# Patient Record
Sex: MALE | Race: WHITE | NOT HISPANIC OR LATINO | Employment: OTHER
[De-identification: names, ages, dates, MRNs, and addresses within clinical notes are randomized per-mention and may not be internally consistent; named-entity substitution may affect disease eponyms.]

---

## 2017-06-07 ENCOUNTER — RECORDS - HEALTHEAST (OUTPATIENT)
Dept: ADMINISTRATIVE | Facility: OTHER | Age: 57
End: 2017-06-07

## 2017-08-01 ENCOUNTER — RECORDS - HEALTHEAST (OUTPATIENT)
Dept: LAB | Facility: CLINIC | Age: 57
End: 2017-08-01

## 2017-08-04 LAB
ALBUMIN PERCENT: 66.4 % (ref 51–67)
ALBUMIN SERPL ELPH-MCNC: 4.7 G/DL (ref 3.2–4.7)
ALPHA 1 PERCENT: 2.7 % (ref 2–4)
ALPHA 2 PERCENT: 9.6 % (ref 5–13)
ALPHA1 GLOB SERPL ELPH-MCNC: 0.2 G/DL (ref 0.1–0.3)
ALPHA2 GLOB SERPL ELPH-MCNC: 0.7 G/DL (ref 0.4–0.9)
B-GLOBULIN SERPL ELPH-MCNC: 0.6 G/DL (ref 0.7–1.2)
BETA PERCENT: 8.8 % (ref 10–17)
GAMMA GLOB SERPL ELPH-MCNC: 0.9 G/DL (ref 0.6–1.4)
GAMMA GLOBULIN PERCENT: 12.5 % (ref 9–20)
M PROTEIN SERPL ELPH-MCNC: 0.3 G/DL
PATH ICD:: ABNORMAL
PROT PATTERN SERPL ELPH-IMP: ABNORMAL
PROT SERPL-MCNC: 7.1 G/DL (ref 6–8)
REVIEWING PATHOLOGIST: ABNORMAL

## 2018-01-02 ENCOUNTER — RECORDS - HEALTHEAST (OUTPATIENT)
Dept: ADMINISTRATIVE | Facility: OTHER | Age: 58
End: 2018-01-02

## 2018-06-27 ENCOUNTER — RECORDS - HEALTHEAST (OUTPATIENT)
Dept: ADMINISTRATIVE | Facility: OTHER | Age: 58
End: 2018-06-27

## 2019-04-10 ENCOUNTER — SURGERY - HEALTHEAST (OUTPATIENT)
Dept: GASTROENTEROLOGY | Facility: HOSPITAL | Age: 59
End: 2019-04-10

## 2019-04-19 ENCOUNTER — RECORDS - HEALTHEAST (OUTPATIENT)
Dept: LAB | Facility: CLINIC | Age: 59
End: 2019-04-19

## 2019-04-19 LAB — TSH SERPL DL<=0.005 MIU/L-ACNC: 1.18 UIU/ML (ref 0.3–5)

## 2019-07-25 ENCOUNTER — RECORDS - HEALTHEAST (OUTPATIENT)
Dept: LAB | Facility: CLINIC | Age: 59
End: 2019-07-25

## 2019-07-25 LAB
BASOPHILS # BLD AUTO: 0.1 THOU/UL (ref 0–0.2)
BASOPHILS NFR BLD AUTO: 2 % (ref 0–2)
EOSINOPHIL COUNT (ABSOLUTE): 0.6 THOU/UL (ref 0–0.4)
EOSINOPHIL NFR BLD AUTO: 8 % (ref 0–6)
ERYTHROCYTE [DISTWIDTH] IN BLOOD BY AUTOMATED COUNT: 13.5 % (ref 11–14.5)
HCT VFR BLD AUTO: 44.4 % (ref 40–54)
HGB BLD-MCNC: 14.9 G/DL (ref 14–18)
LYMPHOCYTES # BLD AUTO: 1.2 THOU/UL (ref 0.8–4.4)
LYMPHOCYTES NFR BLD AUTO: 17 % (ref 20–40)
MCH RBC QN AUTO: 36.9 PG (ref 27–34)
MCHC RBC AUTO-ENTMCNC: 33.6 G/DL (ref 32–36)
MCV RBC AUTO: 110 FL (ref 80–100)
MONOCYTES # BLD AUTO: 0.5 THOU/UL (ref 0–0.9)
MONOCYTES NFR BLD AUTO: 7 % (ref 2–10)
PLAT MORPH BLD: NORMAL
PLATELET # BLD AUTO: 317 THOU/UL (ref 140–440)
PMV BLD AUTO: 10.1 FL (ref 8.5–12.5)
RBC # BLD AUTO: 4.04 MILL/UL (ref 4.4–6.2)
TOTAL NEUTROPHILS-ABS(DIFF): 4.8 THOU/UL (ref 2–7.7)
TOTAL NEUTROPHILS-REL(DIFF): 66 % (ref 50–70)
WBC: 7.2 THOU/UL (ref 4–11)

## 2019-08-22 ENCOUNTER — RECORDS - HEALTHEAST (OUTPATIENT)
Dept: LAB | Facility: CLINIC | Age: 59
End: 2019-08-22

## 2019-08-22 LAB
BASOPHILS # BLD AUTO: 0.1 THOU/UL (ref 0–0.2)
BASOPHILS NFR BLD AUTO: 2 % (ref 0–2)
EOSINOPHIL # BLD AUTO: 0.5 THOU/UL (ref 0–0.4)
EOSINOPHIL NFR BLD AUTO: 9 % (ref 0–6)
ERYTHROCYTE [DISTWIDTH] IN BLOOD BY AUTOMATED COUNT: 13.3 % (ref 11–14.5)
HCT VFR BLD AUTO: 40.9 % (ref 40–54)
HGB BLD-MCNC: 13.9 G/DL (ref 14–18)
LYMPHOCYTES # BLD AUTO: 1 THOU/UL (ref 0.8–4.4)
LYMPHOCYTES NFR BLD AUTO: 19 % (ref 20–40)
MCH RBC QN AUTO: 36.4 PG (ref 27–34)
MCHC RBC AUTO-ENTMCNC: 34 G/DL (ref 32–36)
MCV RBC AUTO: 107 FL (ref 80–100)
MONOCYTES # BLD AUTO: 0.5 THOU/UL (ref 0–0.9)
MONOCYTES NFR BLD AUTO: 9 % (ref 2–10)
NEUTROPHILS # BLD AUTO: 3.2 THOU/UL (ref 2–7.7)
NEUTROPHILS NFR BLD AUTO: 60 % (ref 50–70)
PLATELET # BLD AUTO: 246 THOU/UL (ref 140–440)
PMV BLD AUTO: 10.3 FL (ref 8.5–12.5)
RBC # BLD AUTO: 3.82 MILL/UL (ref 4.4–6.2)
WBC: 5.3 THOU/UL (ref 4–11)

## 2019-09-26 ENCOUNTER — RECORDS - HEALTHEAST (OUTPATIENT)
Dept: LAB | Facility: CLINIC | Age: 59
End: 2019-09-26

## 2019-09-26 LAB
BASOPHILS # BLD AUTO: 0.2 THOU/UL (ref 0–0.2)
BASOPHILS NFR BLD AUTO: 3 % (ref 0–2)
EOSINOPHIL COUNT (ABSOLUTE): 0.7 THOU/UL (ref 0–0.4)
EOSINOPHIL NFR BLD AUTO: 9 % (ref 0–6)
ERYTHROCYTE [DISTWIDTH] IN BLOOD BY AUTOMATED COUNT: 12.9 % (ref 11–14.5)
HCT VFR BLD AUTO: 43 % (ref 40–54)
HGB BLD-MCNC: 15.1 G/DL (ref 14–18)
LYMPHOCYTES # BLD AUTO: 0.9 THOU/UL (ref 0.8–4.4)
LYMPHOCYTES NFR BLD AUTO: 12 % (ref 20–40)
MCH RBC QN AUTO: 37.3 PG (ref 27–34)
MCHC RBC AUTO-ENTMCNC: 35.1 G/DL (ref 32–36)
MCV RBC AUTO: 106 FL (ref 80–100)
MONOCYTES # BLD AUTO: 0.7 THOU/UL (ref 0–0.9)
MONOCYTES NFR BLD AUTO: 9 % (ref 2–10)
PLAT MORPH BLD: NORMAL
PLATELET # BLD AUTO: 291 THOU/UL (ref 140–440)
PMV BLD AUTO: 10.1 FL (ref 8.5–12.5)
RBC # BLD AUTO: 4.05 MILL/UL (ref 4.4–6.2)
TOTAL NEUTROPHILS-ABS(DIFF): 5.2 THOU/UL (ref 2–7.7)
TOTAL NEUTROPHILS-REL(DIFF): 67 % (ref 50–70)
WBC: 7.7 THOU/UL (ref 4–11)

## 2019-10-28 ENCOUNTER — RECORDS - HEALTHEAST (OUTPATIENT)
Dept: LAB | Facility: CLINIC | Age: 59
End: 2019-10-28

## 2019-10-28 LAB
BASOPHILS # BLD AUTO: 0.2 THOU/UL (ref 0–0.2)
BASOPHILS NFR BLD AUTO: 2 % (ref 0–2)
EOSINOPHIL # BLD AUTO: 0.7 THOU/UL (ref 0–0.4)
EOSINOPHIL NFR BLD AUTO: 9 % (ref 0–6)
ERYTHROCYTE [DISTWIDTH] IN BLOOD BY AUTOMATED COUNT: 13.1 % (ref 11–14.5)
HCT VFR BLD AUTO: 44 % (ref 40–54)
HGB BLD-MCNC: 15.3 G/DL (ref 14–18)
LYMPHOCYTES # BLD AUTO: 1.4 THOU/UL (ref 0.8–4.4)
LYMPHOCYTES NFR BLD AUTO: 18 % (ref 20–40)
MCH RBC QN AUTO: 36.3 PG (ref 27–34)
MCHC RBC AUTO-ENTMCNC: 34.8 G/DL (ref 32–36)
MCV RBC AUTO: 104 FL (ref 80–100)
MONOCYTES # BLD AUTO: 0.7 THOU/UL (ref 0–0.9)
MONOCYTES NFR BLD AUTO: 9 % (ref 2–10)
NEUTROPHILS # BLD AUTO: 4.5 THOU/UL (ref 2–7.7)
NEUTROPHILS NFR BLD AUTO: 61 % (ref 50–70)
PLATELET # BLD AUTO: 301 THOU/UL (ref 140–440)
PMV BLD AUTO: 10.2 FL (ref 8.5–12.5)
RBC # BLD AUTO: 4.22 MILL/UL (ref 4.4–6.2)
WBC: 7.4 THOU/UL (ref 4–11)

## 2019-12-04 ENCOUNTER — RECORDS - HEALTHEAST (OUTPATIENT)
Dept: LAB | Facility: CLINIC | Age: 59
End: 2019-12-04

## 2019-12-04 LAB
BASOPHILS # BLD AUTO: 0.2 THOU/UL (ref 0–0.2)
BASOPHILS NFR BLD AUTO: 2 % (ref 0–2)
EOSINOPHIL # BLD AUTO: 0.6 THOU/UL (ref 0–0.4)
EOSINOPHIL NFR BLD AUTO: 9 % (ref 0–6)
ERYTHROCYTE [DISTWIDTH] IN BLOOD BY AUTOMATED COUNT: 13.3 % (ref 11–14.5)
HCT VFR BLD AUTO: 44 % (ref 40–54)
HGB BLD-MCNC: 14.8 G/DL (ref 14–18)
LYMPHOCYTES # BLD AUTO: 1.2 THOU/UL (ref 0.8–4.4)
LYMPHOCYTES NFR BLD AUTO: 18 % (ref 20–40)
MCH RBC QN AUTO: 35.9 PG (ref 27–34)
MCHC RBC AUTO-ENTMCNC: 33.6 G/DL (ref 32–36)
MCV RBC AUTO: 107 FL (ref 80–100)
MONOCYTES # BLD AUTO: 0.6 THOU/UL (ref 0–0.9)
MONOCYTES NFR BLD AUTO: 9 % (ref 2–10)
NEUTROPHILS # BLD AUTO: 4.1 THOU/UL (ref 2–7.7)
NEUTROPHILS NFR BLD AUTO: 61 % (ref 50–70)
PLATELET # BLD AUTO: 257 THOU/UL (ref 140–440)
PMV BLD AUTO: 10.1 FL (ref 8.5–12.5)
RBC # BLD AUTO: 4.12 MILL/UL (ref 4.4–6.2)
WBC: 6.8 THOU/UL (ref 4–11)

## 2021-05-30 ENCOUNTER — RECORDS - HEALTHEAST (OUTPATIENT)
Dept: ADMINISTRATIVE | Facility: CLINIC | Age: 61
End: 2021-05-30

## 2021-06-02 ENCOUNTER — RECORDS - HEALTHEAST (OUTPATIENT)
Dept: ADMINISTRATIVE | Facility: CLINIC | Age: 61
End: 2021-06-02

## 2021-06-16 PROBLEM — R14.2 BELCHING: Status: ACTIVE | Noted: 2019-04-10

## 2021-06-16 PROBLEM — Z12.11 COLON CANCER SCREENING: Status: ACTIVE | Noted: 2019-04-10

## 2021-07-08 ENCOUNTER — TELEPHONE (OUTPATIENT)
Dept: HEMATOLOGY | Facility: CLINIC | Age: 61
End: 2021-07-08

## 2021-07-08 NOTE — TELEPHONE ENCOUNTER
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  He is followed for his care primarily at the [facility] but lives in the [facility] and would like to get established with our center so we would be available to get care locally as issues arise. He said that his nurse at [facility], Neema Lawrence RN, recommended he get connected with us.  Mr. Mac would continue to have his annual comprehensive visits at [facility].  Per patient report, he also has factor 11 and factor 12 deficiencies and has the diagnosis of P. Vera.  He is on hydroxyurea.    Reviewed briefly with Dr. Chapito Singer who agreed to see him on Thursday July 22nd at 10 AM.  Message left for Ed with his wife and asked that Ed call back to our scheduling staff to confirm and to update his insurance as needed.      Fallon Clarke RN - Nurse Clinician - [facility] - [phone]

## 2021-07-22 ENCOUNTER — OFFICE VISIT (OUTPATIENT)
Dept: HEMATOLOGY | Facility: CLINIC | Age: 61
End: 2021-07-22
Attending: INTERNAL MEDICINE
Payer: COMMERCIAL

## 2021-07-22 VITALS
TEMPERATURE: 97.9 F | SYSTOLIC BLOOD PRESSURE: 152 MMHG | RESPIRATION RATE: 14 BRPM | WEIGHT: 200 LBS | OXYGEN SATURATION: 97 % | DIASTOLIC BLOOD PRESSURE: 79 MMHG | BODY MASS INDEX: 27.09 KG/M2 | HEART RATE: 82 BPM | HEIGHT: 72 IN

## 2021-07-22 DIAGNOSIS — D66 MILD HEMOPHILIA A (H): Primary | ICD-10-CM

## 2021-07-22 PROCEDURE — 99203 OFFICE O/P NEW LOW 30 MIN: CPT | Performed by: INTERNAL MEDICINE

## 2021-07-22 PROCEDURE — G0463 HOSPITAL OUTPT CLINIC VISIT: HCPCS

## 2021-07-22 RX ORDER — HYDROXYUREA 500 MG/1
500 CAPSULE ORAL EVERY OTHER DAY
COMMUNITY
Start: 2020-10-12

## 2021-07-22 ASSESSMENT — MIFFLIN-ST. JEOR: SCORE: 1745.19

## 2021-07-22 ASSESSMENT — PAIN SCALES - GENERAL: PAINLEVEL: NO PAIN (0)

## 2021-07-24 ENCOUNTER — CARE COORDINATION (OUTPATIENT)
Dept: HEMATOLOGY | Facility: CLINIC | Age: 61
End: 2021-07-24
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
## 2021-10-26 ENCOUNTER — TELEPHONE (OUTPATIENT)
Dept: HEMATOLOGY | Facility: CLINIC | Age: 61
End: 2021-10-26

## 2021-10-26 DIAGNOSIS — D66 MILD HEMOPHILIA A (H): Primary | ICD-10-CM

## 2021-10-26 RX ORDER — AMINOCAPROIC ACID 0.25 G/ML
3 SYRUP ORAL EVERY 8 HOURS
Qty: 236.5 ML | Refills: 1 | Status: SHIPPED | OUTPATIENT
Start: 2021-10-26 | End: 2024-01-04

## 2021-10-26 NOTE — TELEPHONE ENCOUNTER
Francisco Mac  [id number]  1960  Hemophilia A 12%    Francisco Mac called today to let us know that he is requiring a tooth to be pulled in [place]. He is wondering if he needs factor.  He is having significant pain and hoping to get it removed sometime this week, although no date is set.  Discussed with Dr. Singer who thinks he should get a dose of Jivi 40 unit(s)/kg (~3600) in clinic prior to tooth pulling and Amicar syrup 3 gms every 8 hours x 7 days to start 1 hour prior to tooth pulling.    Placed order for Jivi, but PA is required. Pharmacy is working on PA. To alert patient when approved. If out of pocket cost prohibitive, Dr. Singer said he could change factor VIII product.  Kerrie Rasheed, MSN, RN, PHN -Nurse Clinician, [facility] [phone]

## 2021-10-28 ENCOUNTER — ALLIED HEALTH/NURSE VISIT (OUTPATIENT)
Dept: HEMATOLOGY | Facility: CLINIC | Age: 61
End: 2021-10-28
Payer: COMMERCIAL

## 2021-10-28 DIAGNOSIS — D66 MILD HEMOPHILIA A (H): Primary | ICD-10-CM

## 2022-11-08 ENCOUNTER — LAB REQUISITION (OUTPATIENT)
Dept: LAB | Facility: CLINIC | Age: 62
End: 2022-11-08

## 2022-11-08 DIAGNOSIS — R53.82 CHRONIC FATIGUE, UNSPECIFIED: ICD-10-CM

## 2022-11-08 DIAGNOSIS — Z12.5 ENCOUNTER FOR SCREENING FOR MALIGNANT NEOPLASM OF PROSTATE: ICD-10-CM

## 2022-11-08 PROCEDURE — 84443 ASSAY THYROID STIM HORMONE: CPT | Performed by: FAMILY MEDICINE

## 2022-11-08 PROCEDURE — 80053 COMPREHEN METABOLIC PANEL: CPT | Performed by: FAMILY MEDICINE

## 2022-11-08 PROCEDURE — G0103 PSA SCREENING: HCPCS | Performed by: FAMILY MEDICINE

## 2022-11-09 LAB
ALBUMIN SERPL BCG-MCNC: 4.6 G/DL (ref 3.5–5.2)
ALP SERPL-CCNC: 51 U/L (ref 40–129)
ALT SERPL W P-5'-P-CCNC: 29 U/L (ref 10–50)
ANION GAP SERPL CALCULATED.3IONS-SCNC: 12 MMOL/L (ref 7–15)
AST SERPL W P-5'-P-CCNC: 19 U/L (ref 10–50)
BILIRUB SERPL-MCNC: 0.6 MG/DL
BUN SERPL-MCNC: 21.9 MG/DL (ref 8–23)
CALCIUM SERPL-MCNC: 9.6 MG/DL (ref 8.8–10.2)
CHLORIDE SERPL-SCNC: 106 MMOL/L (ref 98–107)
CREAT SERPL-MCNC: 1.06 MG/DL (ref 0.67–1.17)
DEPRECATED HCO3 PLAS-SCNC: 23 MMOL/L (ref 22–29)
GFR SERPL CREATININE-BSD FRML MDRD: 78 ML/MIN/1.73M2
GLUCOSE SERPL-MCNC: 96 MG/DL (ref 70–99)
POTASSIUM SERPL-SCNC: 5.3 MMOL/L (ref 3.4–5.3)
PROT SERPL-MCNC: 6.9 G/DL (ref 6.4–8.3)
PSA SERPL-MCNC: 0.42 NG/ML (ref 0–4.5)
SODIUM SERPL-SCNC: 141 MMOL/L (ref 136–145)
TSH SERPL DL<=0.005 MIU/L-ACNC: 2.34 UIU/ML (ref 0.3–4.2)

## 2022-11-29 ENCOUNTER — LAB REQUISITION (OUTPATIENT)
Dept: LAB | Facility: CLINIC | Age: 62
End: 2022-11-29

## 2022-11-29 DIAGNOSIS — I10 ESSENTIAL (PRIMARY) HYPERTENSION: ICD-10-CM

## 2022-11-29 LAB
ANION GAP SERPL CALCULATED.3IONS-SCNC: 11 MMOL/L (ref 7–15)
BUN SERPL-MCNC: 15.7 MG/DL (ref 8–23)
CALCIUM SERPL-MCNC: 10 MG/DL (ref 8.8–10.2)
CHLORIDE SERPL-SCNC: 104 MMOL/L (ref 98–107)
CREAT SERPL-MCNC: 0.9 MG/DL (ref 0.67–1.17)
DEPRECATED HCO3 PLAS-SCNC: 24 MMOL/L (ref 22–29)
GFR SERPL CREATININE-BSD FRML MDRD: >90 ML/MIN/1.73M2
GLUCOSE SERPL-MCNC: 87 MG/DL (ref 70–99)
POTASSIUM SERPL-SCNC: 4.7 MMOL/L (ref 3.4–5.3)
SODIUM SERPL-SCNC: 139 MMOL/L (ref 136–145)

## 2022-11-29 PROCEDURE — 82310 ASSAY OF CALCIUM: CPT | Performed by: FAMILY MEDICINE

## 2023-01-03 ENCOUNTER — LAB REQUISITION (OUTPATIENT)
Dept: LAB | Facility: CLINIC | Age: 63
End: 2023-01-03

## 2023-01-03 DIAGNOSIS — I10 ESSENTIAL (PRIMARY) HYPERTENSION: ICD-10-CM

## 2023-01-03 LAB
ANION GAP SERPL CALCULATED.3IONS-SCNC: 13 MMOL/L (ref 7–15)
BUN SERPL-MCNC: 15.4 MG/DL (ref 8–23)
CALCIUM SERPL-MCNC: 9.1 MG/DL (ref 8.8–10.2)
CHLORIDE SERPL-SCNC: 108 MMOL/L (ref 98–107)
CREAT SERPL-MCNC: 0.93 MG/DL (ref 0.67–1.17)
DEPRECATED HCO3 PLAS-SCNC: 23 MMOL/L (ref 22–29)
GFR SERPL CREATININE-BSD FRML MDRD: >90 ML/MIN/1.73M2
GLUCOSE SERPL-MCNC: 87 MG/DL (ref 70–99)
POTASSIUM SERPL-SCNC: 5 MMOL/L (ref 3.4–5.3)
SODIUM SERPL-SCNC: 144 MMOL/L (ref 136–145)

## 2023-01-03 PROCEDURE — 80048 BASIC METABOLIC PNL TOTAL CA: CPT | Performed by: FAMILY MEDICINE

## 2023-02-02 ENCOUNTER — LAB REQUISITION (OUTPATIENT)
Dept: LAB | Facility: CLINIC | Age: 63
End: 2023-02-02

## 2023-02-02 DIAGNOSIS — I10 ESSENTIAL (PRIMARY) HYPERTENSION: ICD-10-CM

## 2023-02-02 LAB
ANION GAP SERPL CALCULATED.3IONS-SCNC: 14 MMOL/L (ref 7–15)
BUN SERPL-MCNC: 22.4 MG/DL (ref 8–23)
CALCIUM SERPL-MCNC: 9.6 MG/DL (ref 8.8–10.2)
CHLORIDE SERPL-SCNC: 102 MMOL/L (ref 98–107)
CREAT SERPL-MCNC: 1.02 MG/DL (ref 0.67–1.17)
DEPRECATED HCO3 PLAS-SCNC: 22 MMOL/L (ref 22–29)
GFR SERPL CREATININE-BSD FRML MDRD: 82 ML/MIN/1.73M2
GLUCOSE SERPL-MCNC: 92 MG/DL (ref 70–99)
POTASSIUM SERPL-SCNC: 4.7 MMOL/L (ref 3.4–5.3)
SODIUM SERPL-SCNC: 138 MMOL/L (ref 136–145)

## 2023-02-02 PROCEDURE — 80048 BASIC METABOLIC PNL TOTAL CA: CPT | Performed by: FAMILY MEDICINE

## 2023-02-09 NOTE — TELEPHONE ENCOUNTER
Catheter removed intact. Luis Eburt DAVID Mac called in to check on status of PA for Jivi factor VIII and his tooth is increasingly bothersome.  Per pharmacy, Jivi is approved.  Ed made appt for his dentist for 1410 on 1/28/2021 and agreed to come to clinic for Jivi infusion at 1200.  He will also need to  Amicar at that time. Fallon Clarke, RN - Nurse Clinician - [facility] - [phone]

## 2023-08-08 ENCOUNTER — LAB REQUISITION (OUTPATIENT)
Dept: LAB | Facility: CLINIC | Age: 63
End: 2023-08-08

## 2023-08-08 DIAGNOSIS — I10 ESSENTIAL (PRIMARY) HYPERTENSION: ICD-10-CM

## 2023-08-08 PROCEDURE — 80048 BASIC METABOLIC PNL TOTAL CA: CPT | Performed by: FAMILY MEDICINE

## 2023-08-09 LAB
ANION GAP SERPL CALCULATED.3IONS-SCNC: 14 MMOL/L (ref 7–15)
BUN SERPL-MCNC: 21 MG/DL (ref 8–23)
CALCIUM SERPL-MCNC: 9.2 MG/DL (ref 8.8–10.2)
CHLORIDE SERPL-SCNC: 105 MMOL/L (ref 98–107)
CREAT SERPL-MCNC: 1.41 MG/DL (ref 0.67–1.17)
DEPRECATED HCO3 PLAS-SCNC: 23 MMOL/L (ref 22–29)
GFR SERPL CREATININE-BSD FRML MDRD: 56 ML/MIN/1.73M2
GLUCOSE SERPL-MCNC: 99 MG/DL (ref 70–99)
POTASSIUM SERPL-SCNC: 3.9 MMOL/L (ref 3.4–5.3)
SODIUM SERPL-SCNC: 142 MMOL/L (ref 136–145)

## 2023-09-15 ENCOUNTER — LAB REQUISITION (OUTPATIENT)
Dept: LAB | Facility: CLINIC | Age: 63
End: 2023-09-15
Payer: MEDICARE

## 2023-09-15 DIAGNOSIS — I10 ESSENTIAL (PRIMARY) HYPERTENSION: ICD-10-CM

## 2023-09-15 LAB
ALBUMIN SERPL BCG-MCNC: 4.4 G/DL (ref 3.5–5.2)
ALP SERPL-CCNC: 50 U/L (ref 40–129)
ALT SERPL W P-5'-P-CCNC: 29 U/L (ref 0–70)
ANION GAP SERPL CALCULATED.3IONS-SCNC: 14 MMOL/L (ref 7–15)
AST SERPL W P-5'-P-CCNC: 24 U/L (ref 0–45)
BILIRUB SERPL-MCNC: 1 MG/DL
BUN SERPL-MCNC: 16.8 MG/DL (ref 8–23)
CALCIUM SERPL-MCNC: 9.7 MG/DL (ref 8.8–10.2)
CHLORIDE SERPL-SCNC: 101 MMOL/L (ref 98–107)
CREAT SERPL-MCNC: 1.01 MG/DL (ref 0.67–1.17)
DEPRECATED HCO3 PLAS-SCNC: 24 MMOL/L (ref 22–29)
EGFRCR SERPLBLD CKD-EPI 2021: 83 ML/MIN/1.73M2
GLUCOSE SERPL-MCNC: 85 MG/DL (ref 70–99)
POTASSIUM SERPL-SCNC: 4.1 MMOL/L (ref 3.4–5.3)
PROT SERPL-MCNC: 6.9 G/DL (ref 6.4–8.3)
SODIUM SERPL-SCNC: 139 MMOL/L (ref 136–145)

## 2023-09-15 PROCEDURE — 80053 COMPREHEN METABOLIC PANEL: CPT | Mod: ORL | Performed by: FAMILY MEDICINE

## 2023-11-13 ENCOUNTER — TRANSFERRED RECORDS (OUTPATIENT)
Dept: HEALTH INFORMATION MANAGEMENT | Facility: CLINIC | Age: 63
End: 2023-11-13

## 2023-11-16 ENCOUNTER — TRANSFERRED RECORDS (OUTPATIENT)
Dept: HEALTH INFORMATION MANAGEMENT | Facility: CLINIC | Age: 63
End: 2023-11-16

## 2023-11-16 LAB — EJECTION FRACTION: NORMAL %

## 2023-11-28 ENCOUNTER — TRANSFERRED RECORDS (OUTPATIENT)
Dept: HEALTH INFORMATION MANAGEMENT | Facility: CLINIC | Age: 63
End: 2023-11-28
Payer: COMMERCIAL

## 2023-12-05 ENCOUNTER — TRANSFERRED RECORDS (OUTPATIENT)
Dept: HEALTH INFORMATION MANAGEMENT | Facility: CLINIC | Age: 63
End: 2023-12-05
Payer: COMMERCIAL

## 2023-12-08 ENCOUNTER — MEDICAL CORRESPONDENCE (OUTPATIENT)
Dept: HEALTH INFORMATION MANAGEMENT | Facility: CLINIC | Age: 63
End: 2023-12-08
Payer: COMMERCIAL

## 2023-12-11 DIAGNOSIS — R06.00 DYSPNEA: Primary | ICD-10-CM

## 2023-12-12 RX ORDER — LOSARTAN POTASSIUM AND HYDROCHLOROTHIAZIDE 25; 100 MG/1; MG/1
1 TABLET ORAL DAILY
COMMUNITY

## 2023-12-19 ENCOUNTER — TRANSFERRED RECORDS (OUTPATIENT)
Dept: HEALTH INFORMATION MANAGEMENT | Facility: CLINIC | Age: 63
End: 2023-12-19
Payer: COMMERCIAL

## 2023-12-26 ENCOUNTER — ANESTHESIA EVENT (OUTPATIENT)
Dept: SURGERY | Facility: CLINIC | Age: 63
End: 2023-12-26
Payer: COMMERCIAL

## 2023-12-26 ENCOUNTER — ANESTHESIA (OUTPATIENT)
Dept: SURGERY | Facility: CLINIC | Age: 63
End: 2023-12-26
Payer: COMMERCIAL

## 2023-12-26 ENCOUNTER — HOSPITAL ENCOUNTER (OUTPATIENT)
Facility: CLINIC | Age: 63
Discharge: HOME | End: 2023-12-26
Attending: INTERNAL MEDICINE | Admitting: INTERNAL MEDICINE
Payer: COMMERCIAL

## 2023-12-26 VITALS
DIASTOLIC BLOOD PRESSURE: 67 MMHG | BODY MASS INDEX: 27.22 KG/M2 | RESPIRATION RATE: 16 BRPM | HEART RATE: 72 BPM | WEIGHT: 201 LBS | HEIGHT: 72 IN | TEMPERATURE: 97.3 F | OXYGEN SATURATION: 94 % | SYSTOLIC BLOOD PRESSURE: 117 MMHG

## 2023-12-26 LAB — UPPER GI ENDOSCOPY: NORMAL

## 2023-12-26 PROCEDURE — 272N000001 HC OR GENERAL SUPPLY STERILE: Performed by: INTERNAL MEDICINE

## 2023-12-26 PROCEDURE — 88342 IMHCHEM/IMCYTCHM 1ST ANTB: CPT | Mod: TC | Performed by: INTERNAL MEDICINE

## 2023-12-26 PROCEDURE — 370N000017 HC ANESTHESIA TECHNICAL FEE, PER MIN: Performed by: INTERNAL MEDICINE

## 2023-12-26 PROCEDURE — 250N000011 HC RX IP 250 OP 636: Performed by: NURSE ANESTHETIST, CERTIFIED REGISTERED

## 2023-12-26 PROCEDURE — 258N000003 HC RX IP 258 OP 636: Performed by: ANESTHESIOLOGY

## 2023-12-26 PROCEDURE — 360N000075 HC SURGERY LEVEL 2, PER MIN: Performed by: INTERNAL MEDICINE

## 2023-12-26 PROCEDURE — 999N000141 HC STATISTIC PRE-PROCEDURE NURSING ASSESSMENT: Performed by: INTERNAL MEDICINE

## 2023-12-26 PROCEDURE — 710N000012 HC RECOVERY PHASE 2, PER MINUTE: Performed by: INTERNAL MEDICINE

## 2023-12-26 RX ORDER — NALOXONE HYDROCHLORIDE 0.4 MG/ML
0.2 INJECTION, SOLUTION INTRAMUSCULAR; INTRAVENOUS; SUBCUTANEOUS
Status: DISCONTINUED | OUTPATIENT
Start: 2023-12-26 | End: 2023-12-26 | Stop reason: HOSPADM

## 2023-12-26 RX ORDER — ONDANSETRON 4 MG/1
4 TABLET, ORALLY DISINTEGRATING ORAL EVERY 30 MIN PRN
Status: DISCONTINUED | OUTPATIENT
Start: 2023-12-26 | End: 2023-12-26 | Stop reason: HOSPADM

## 2023-12-26 RX ORDER — OXYCODONE HYDROCHLORIDE 5 MG/1
10 TABLET ORAL
Status: DISCONTINUED | OUTPATIENT
Start: 2023-12-26 | End: 2023-12-26 | Stop reason: HOSPADM

## 2023-12-26 RX ORDER — HEPARIN SODIUM,PORCINE 10 UNIT/ML
5-10 VIAL (ML) INTRAVENOUS
Status: DISCONTINUED | OUTPATIENT
Start: 2023-12-26 | End: 2023-12-26

## 2023-12-26 RX ORDER — PROCHLORPERAZINE MALEATE 5 MG
5 TABLET ORAL EVERY 6 HOURS PRN
Status: DISCONTINUED | OUTPATIENT
Start: 2023-12-26 | End: 2023-12-26 | Stop reason: HOSPADM

## 2023-12-26 RX ORDER — FLUMAZENIL 0.1 MG/ML
0.2 INJECTION, SOLUTION INTRAVENOUS
Status: DISCONTINUED | OUTPATIENT
Start: 2023-12-26 | End: 2023-12-26 | Stop reason: HOSPADM

## 2023-12-26 RX ORDER — NALOXONE HYDROCHLORIDE 0.4 MG/ML
0.4 INJECTION, SOLUTION INTRAMUSCULAR; INTRAVENOUS; SUBCUTANEOUS
Status: DISCONTINUED | OUTPATIENT
Start: 2023-12-26 | End: 2023-12-26 | Stop reason: HOSPADM

## 2023-12-26 RX ORDER — HEPARIN SODIUM,PORCINE 10 UNIT/ML
5-10 VIAL (ML) INTRAVENOUS EVERY 24 HOURS
Status: DISCONTINUED | OUTPATIENT
Start: 2023-12-26 | End: 2023-12-26

## 2023-12-26 RX ORDER — OXYCODONE HYDROCHLORIDE 5 MG/1
5 TABLET ORAL
Status: DISCONTINUED | OUTPATIENT
Start: 2023-12-26 | End: 2023-12-26 | Stop reason: HOSPADM

## 2023-12-26 RX ORDER — LIDOCAINE 40 MG/G
CREAM TOPICAL
Status: DISCONTINUED | OUTPATIENT
Start: 2023-12-26 | End: 2023-12-26 | Stop reason: HOSPADM

## 2023-12-26 RX ORDER — HEPARIN SODIUM (PORCINE) LOCK FLUSH IV SOLN 100 UNIT/ML 100 UNIT/ML
5-10 SOLUTION INTRAVENOUS
Status: DISCONTINUED | OUTPATIENT
Start: 2023-12-26 | End: 2023-12-26

## 2023-12-26 RX ORDER — SODIUM CHLORIDE, SODIUM LACTATE, POTASSIUM CHLORIDE, CALCIUM CHLORIDE 600; 310; 30; 20 MG/100ML; MG/100ML; MG/100ML; MG/100ML
INJECTION, SOLUTION INTRAVENOUS CONTINUOUS
Status: DISCONTINUED | OUTPATIENT
Start: 2023-12-26 | End: 2023-12-26 | Stop reason: HOSPADM

## 2023-12-26 RX ORDER — ONDANSETRON 2 MG/ML
4 INJECTION INTRAMUSCULAR; INTRAVENOUS EVERY 30 MIN PRN
Status: DISCONTINUED | OUTPATIENT
Start: 2023-12-26 | End: 2023-12-26 | Stop reason: HOSPADM

## 2023-12-26 RX ORDER — ONDANSETRON 2 MG/ML
4 INJECTION INTRAMUSCULAR; INTRAVENOUS EVERY 6 HOURS PRN
Status: DISCONTINUED | OUTPATIENT
Start: 2023-12-26 | End: 2023-12-26 | Stop reason: HOSPADM

## 2023-12-26 RX ORDER — PROPOFOL 10 MG/ML
INJECTION, EMULSION INTRAVENOUS CONTINUOUS PRN
Status: DISCONTINUED | OUTPATIENT
Start: 2023-12-26 | End: 2023-12-26

## 2023-12-26 RX ORDER — PROPOFOL 10 MG/ML
INJECTION, EMULSION INTRAVENOUS PRN
Status: DISCONTINUED | OUTPATIENT
Start: 2023-12-26 | End: 2023-12-26

## 2023-12-26 RX ORDER — ONDANSETRON 4 MG/1
4 TABLET, ORALLY DISINTEGRATING ORAL EVERY 6 HOURS PRN
Status: DISCONTINUED | OUTPATIENT
Start: 2023-12-26 | End: 2023-12-26 | Stop reason: HOSPADM

## 2023-12-26 RX ADMIN — PROPOFOL 30 MG: 10 INJECTION, EMULSION INTRAVENOUS at 10:42

## 2023-12-26 RX ADMIN — PROPOFOL 100 MCG/KG/MIN: 10 INJECTION, EMULSION INTRAVENOUS at 10:38

## 2023-12-26 RX ADMIN — PROPOFOL 20 MG: 10 INJECTION, EMULSION INTRAVENOUS at 10:39

## 2023-12-26 RX ADMIN — PROPOFOL 20 MG: 10 INJECTION, EMULSION INTRAVENOUS at 10:46

## 2023-12-26 RX ADMIN — SODIUM CHLORIDE, POTASSIUM CHLORIDE, SODIUM LACTATE AND CALCIUM CHLORIDE: 600; 310; 30; 20 INJECTION, SOLUTION INTRAVENOUS at 09:45

## 2023-12-26 ASSESSMENT — ACTIVITIES OF DAILY LIVING (ADL): ADLS_ACUITY_SCORE: 35
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
## 2023-12-26 NOTE — ANESTHESIA CARE TRANSFER NOTE
Patient: Francisco Mac    Procedure: Procedure(s):  ESOPHAGOGASTRODUODENOSCOPY       Diagnosis: Eructation [R14.2]  Diagnosis Additional Information: No value filed.    Anesthesia Type:   MAC     Note:    Oropharynx: oropharynx clear of all foreign objects and spontaneously breathing  Level of Consciousness: awake  Oxygen Supplementation: face mask  Level of Supplemental Oxygen (L/min / FiO2): 5  Independent Airway: airway patency satisfactory and stable  Dentition: dentition unchanged  Vital Signs Stable: post-procedure vital signs reviewed and stable  Report to RN Given: handoff report given  Patient transferred to: Phase II    Handoff Report: Identifed the Patient, Identified the Reponsible Provider, Reviewed the pertinent medical history, Discussed the surgical course, Reviewed Intra-OP anesthesia mangement and issues during anesthesia, Set expectations for post-procedure period and Allowed opportunity for questions and acknowledgement of understanding  Vitals:  Vitals Value Taken Time   BP     Temp     Pulse     Resp     SpO2         Electronically Signed By: BERNADINE Lao CRNA  December 26, 2023  10:22 AM

## 2023-12-26 NOTE — ANESTHESIA CARE TRANSFER NOTE
Patient: Francisco Mac    Procedure: Procedure(s):  ESOPHAGOGASTRODUODENOSCOPY with biopsies       Diagnosis: Eructation [R14.2]  Diagnosis Additional Information: No value filed.    Anesthesia Type:   MAC     Note:    Oropharynx: oropharynx clear of all foreign objects and spontaneously breathing  Level of Consciousness: awake  Oxygen Supplementation: face mask  Level of Supplemental Oxygen (L/min / FiO2): 5  Independent Airway: airway patency satisfactory and stable  Dentition: dentition unchanged  Vital Signs Stable: post-procedure vital signs reviewed and stable  Report to RN Given: handoff report given  Patient transferred to: Phase II  Comments: VS stable, se Post procedure RN note for VS    Vitals:  Vitals Value Taken Time   BP     Temp     Pulse     Resp     SpO2         Electronically Signed By: BERNADINE Lao CRNA  December 26, 2023  10:59 AM

## 2023-12-26 NOTE — H&P
Pre-procedure Note    Reason for procedure: belching, bloating    History and Physical Reviewed: Reviewed, no changes.    Pre-sedation assessment:    General: alert, appears stated age, and cooperative  Airway: normal  Heart: regular rate and rhythm  Lungs: clear to auscultation bilaterally    Sedation Plan based on assessment: MAC    Mallampati score: Class II (visualization of the soft palate, fauces, and uvula)          ASA Classification: ASA 2 - Patient with mild systemic disease with no functional limitations    Impression: Patient deemed adequate candidate for MAC sedation    Risks, benefits and alternatives were discussed with the patient and informed consent was obtained.    Plan: esophagogastroduodenoscopy    Thank you for the opportunity to participate in the care of this patient. Please feel free to call with any questions or concerns.     Phone number [phone].     Chandra Shukla MD 12/26/2023 10:30 AM

## 2023-12-26 NOTE — ANESTHESIA POSTPROCEDURE EVALUATION
Patient: Francisco Mac    Procedure: Procedure(s):  ESOPHAGOGASTRODUODENOSCOPY with biopsies       Anesthesia Type:  MAC    Note:  Disposition: Outpatient   Postop Pain Control: Uneventful            Sign Out: Well controlled pain   PONV: No   Neuro/Psych: Uneventful            Sign Out: Acceptable/Baseline neuro status   Airway/Respiratory: Uneventful            Sign Out: Acceptable/Baseline resp. status   CV/Hemodynamics: Uneventful            Sign Out: Acceptable CV status; No obvious hypovolemia; No obvious fluid overload   Other NRE: NONE   DID A NON-ROUTINE EVENT OCCUR? No           Last vitals:  Vitals Value Taken Time   /67 12/26/23 1110   Temp 36.3  C (97.3  F) 12/26/23 1100   Pulse 72 12/26/23 1111   Resp     SpO2 95 % 12/26/23 1111   Vitals shown include unfiled device data.    Electronically Signed By: Víctor Carrillo MD  December 26, 2023  12:16 PM

## 2023-12-27 LAB
PATH REPORT.COMMENTS IMP SPEC: NORMAL
PATH REPORT.COMMENTS IMP SPEC: NORMAL
PATH REPORT.FINAL DX SPEC: NORMAL
PATH REPORT.GROSS SPEC: NORMAL
PATH REPORT.MICROSCOPIC SPEC OTHER STN: NORMAL
PATH REPORT.RELEVANT HX SPEC: NORMAL
PHOTO IMAGE: NORMAL

## 2023-12-27 PROCEDURE — 88342 IMHCHEM/IMCYTCHM 1ST ANTB: CPT | Mod: 26 | Performed by: PATHOLOGY

## 2023-12-27 PROCEDURE — 88305 TISSUE EXAM BY PATHOLOGIST: CPT | Mod: 26 | Performed by: PATHOLOGY

## 2024-01-04 ENCOUNTER — OFFICE VISIT (OUTPATIENT)
Dept: PULMONOLOGY | Facility: CLINIC | Age: 64
End: 2024-01-04
Payer: COMMERCIAL

## 2024-01-04 ENCOUNTER — TELEPHONE (OUTPATIENT)
Dept: CARDIOLOGY | Facility: CLINIC | Age: 64
End: 2024-01-04

## 2024-01-04 VITALS
OXYGEN SATURATION: 96 % | WEIGHT: 197.2 LBS | HEART RATE: 93 BPM | HEIGHT: 71 IN | SYSTOLIC BLOOD PRESSURE: 130 MMHG | BODY MASS INDEX: 27.61 KG/M2 | DIASTOLIC BLOOD PRESSURE: 68 MMHG

## 2024-01-04 DIAGNOSIS — R59.0 HILAR LYMPHADENOPATHY: ICD-10-CM

## 2024-01-04 DIAGNOSIS — R91.8 PULMONARY NODULES: ICD-10-CM

## 2024-01-04 DIAGNOSIS — R06.02 SOB (SHORTNESS OF BREATH): ICD-10-CM

## 2024-01-04 DIAGNOSIS — I27.20 PULMONARY HYPERTENSION (H): Primary | ICD-10-CM

## 2024-01-04 DIAGNOSIS — Z11.59 ENCOUNTER FOR SCREENING FOR OTHER VIRAL DISEASES: ICD-10-CM

## 2024-01-04 DIAGNOSIS — R06.09 DYSPNEA ON EXERTION: ICD-10-CM

## 2024-01-04 DIAGNOSIS — R73.03 PREDIABETES: ICD-10-CM

## 2024-01-04 DIAGNOSIS — J44.9 CHRONIC OBSTRUCTIVE PULMONARY DISEASE, UNSPECIFIED COPD TYPE (H): Primary | ICD-10-CM

## 2024-01-04 DIAGNOSIS — R06.00 DYSPNEA: ICD-10-CM

## 2024-01-04 DIAGNOSIS — D50.9 IRON DEFICIENCY ANEMIA, UNSPECIFIED: ICD-10-CM

## 2024-01-04 LAB — HGB BLD-MCNC: 16.4 G/DL

## 2024-01-04 PROCEDURE — 94060 EVALUATION OF WHEEZING: CPT | Performed by: INTERNAL MEDICINE

## 2024-01-04 PROCEDURE — 94726 PLETHYSMOGRAPHY LUNG VOLUMES: CPT | Performed by: INTERNAL MEDICINE

## 2024-01-04 PROCEDURE — 85018 HEMOGLOBIN: CPT | Mod: QW | Performed by: INTERNAL MEDICINE

## 2024-01-04 PROCEDURE — 94729 DIFFUSING CAPACITY: CPT | Performed by: INTERNAL MEDICINE

## 2024-01-04 PROCEDURE — 99204 OFFICE O/P NEW MOD 45 MIN: CPT | Performed by: NURSE PRACTITIONER

## 2024-01-04 RX ORDER — TIOTROPIUM BROMIDE 18 UG/1
18 CAPSULE ORAL; RESPIRATORY (INHALATION) DAILY
Qty: 30 CAPSULE | Refills: 11 | Status: SHIPPED | OUTPATIENT
Start: 2024-01-04 | End: 2024-08-15

## 2024-01-04 RX ORDER — ALBUTEROL SULFATE 90 UG/1
2 AEROSOL, METERED RESPIRATORY (INHALATION) EVERY 6 HOURS PRN
Qty: 18 G | Refills: 3 | Status: SHIPPED | OUTPATIENT
Start: 2024-01-04

## 2024-01-04 NOTE — PROGRESS NOTES
Patient oxygen saturation on RA at rest is 96%.  Oxygen saturation after ambulating 400ft on RA is 93% heart rate 108.        Patient is ambulatory within his/her home.      Isabelle Ash LPN

## 2024-01-04 NOTE — TELEPHONE ENCOUNTER
[facility] Call Center    Phone Message    May a detailed message be left on voicemail: no     Reason for Call: Appointment Intake    Referring Provider Name:     Fior Muñoz APRN CNP in [facility] PULMONOLOGY     Diagnosis and/or Symptoms: Referral for Pulmonary Hypertension; increased dyspnea, decreased DLCO. stress echo with elevated RV pressure 38 mmHg.     Please call pt to schedule.     Action Taken: Other: cardio    Travel Screening: Not Applicable

## 2024-01-04 NOTE — PATIENT INSTRUCTIONS
It was a pleasure seeing you in clinic today. This is what we discussed:    Start the Spiriva 1 puff every day no matter what. Use this daily until we meet again.   You can schedule a CT scan with follow up with Dr. Gil in 3 months.   Use your albuterol every 4 hours as needed for cough, shortness of breath, wheeze, or chest tightness.   Follow up with me in 6 weeks.   If you have worsening symptoms, questions, or need to speak with the nurse please call [phone].

## 2024-01-04 NOTE — PROGRESS NOTES
Pulmonary Outpatient Consult Note  January 4, 2024      Assessment and Plan:   Francisco Mac is a 63 year old male, former smoker, with history of polycythemia vera, HTN, and hemophilia presenting today for evaluation of cough and dyspnea.   Reports dyspnea on exertion for the past few months.  He is still active daily walking his dog and is able to complete all ADLs but has noticed feeling winded.  Around the same time developed a productive cough that is worst at night.  Multiple nighttime wakings to clear sputum.  Also reports unintentional 15 pound weight loss over the past few months with bloating and frequent burping.  Denies reflux.  Previous EGD in 2019 was normal.  He does have a history of polycythemia vera and has planned follow-up with provider at [facility] for this.  PFTs show mild obstruction, no significant bronchodilator response.  Air trapping.  And mildly decreased diffusing capacity.  Clinic walk did not demonstrate desaturation with activity. Lung exam with expiratory rhonchi today.   Recent CT chest shows 7 mm nodule that has increased in size from 2011, left upper lobe 5 mm nodule that has been stable since 2011, and the right lung nodule measuring 6 to 7 mm.  Enlarged right hilar and subcarinal lymph node.  Stress echo on 11/16 shows no evidence of ischemia or infarction.  There is mild tricuspid regurgitation with RV systolic pressure of 38 mmHg and mildly elevated right arterial pressure.    #. Dyspnea with exertion: Given obstructive ratio with emphysema noted on recent CT chest this could be related to COPD.  Unsure if cardiac component given echocardiogram results.   Cardiology referral to discuss possibility of cardiac contribution to shortness of breath.  Will trial inhaler for treatment of COPD.  Encouraged continued activity as able.  #. Cough, COPD: Will trial LAMA inhaler.  If not helpful or if symptoms persist consider adding LABA.  Spiriva or Incruse, whichever is covered by  insurance.  1 puff daily.  We talked about using this every day no matter what until follow-up.  Trial albuterol prior to activity and as needed for cough.  #.  Lung nodules: Given smoking history, enlarged lymph nodes, and weight loss we will plan for recommended CT with contrast with follow-up with MD in clinic.  CT chest with contrast in 3 months.  Ordered today.  #. HCM: Up-to-date with initial COVID series, needs booster.  Recommend seasonal flu, RSV, updated COVID booster, and pneumococcal vaccines if not already done.    RTC 6 weeks for inhaler recheck    Fior Muñoz CNP  Pulmonary Medicine  ______________________________________________________________________________    CC:   Chief Complaint   Patient presents with    Consult     Dyspnea        HPI:   Sid presents today for evaluation of cough and shortness of breath.  Had SOB, cough, and burping that started 3 months ago. Unsure if SOB was progressive, noticed it one day.    [place] and stairs are hard for him but he walks his dog for 1 mile daily.   The cough is worse at night and productive of thick sputum. He will have to get our of bed multiple times per night to cough up mucous. The sputum is white/green.  He has not tried any inhaled medications.  Has been smoking marijuana daily until 2 weeks ago. It has become difficult since the cough has started to do this.  Stop smoking in 2019, he has a 80-pack-year history.  Recent stress echo normal, no evidence of ischemia or infarction.  There was mild tricuspid regurgitation with RV systolic pressure of 38 mmHg and mildly elevated right arterial pressure. Denies edema.   Has lost 15lbs in the past few months.     He has had a EGD that was normal.  Followed by heme-onc at [facility] for polycythemia vera, plans for lab work with follow-up.  On hydroxyurea.  Has throat cancer history noted on chart but patient states this is incorrect. His wife had throat cancer last year and is in remission.     Exposure  history: [employer] work.  Reports regular spirometry testing at work that was normal in the past.    PMH:  Patient Active Problem List    Diagnosis Date Noted    Polycythemia vera (H)      Priority: Medium     Created by Conversion        [place] 04/10/2019     Priority: Medium    Colon cancer screening 04/10/2019     Priority: Medium    Laryngeal Cancer      Priority: Medium     Created by Conversion  Replacement Utility updated for latest IMO load        Monoclonal Gammopathy Of Undetermined Significance      Priority: Medium     Created by Conversion        Iron Deficiency      Priority: Medium     Created by Conversion        Myelodysplasia      Priority: Medium     Created by Conversion        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      Priority: Medium     Created by Conversion           PSH:  Past Surgical History:   Procedure Laterality Date    COLONOSCOPY N/A 4/10/2019    Procedure: COLONOSCOPY;  Surgeon: Sid Waters MD;  Location: [facility];  Service: Gastroenterology    ESOPHAGOSCOPY, GASTROSCOPY, DUODENOSCOPY (EGD), COMBINED N/A 12/26/2023    Procedure: ESOPHAGOGASTRODUODENOSCOPY WITH BIOPSIES;  Surgeon: Chandra Shukla MD;  Location: [facility] Main OR    OTHER SURGICAL HISTORY Left     left shoulder surgeryshoulder cleaned up and bicep cut    OTHER SURGICAL HISTORY  01/01/2009    abdominal vessel surgeryvessel from kidney to spleen ruptured, surgical repaired    IA ESOPHAGOGASTRODUODENOSCOPY TRANSORAL DIAGNOSTIC N/A 4/10/2019    Procedure: ESOPHAGOGASTRODUODENOSCOPY with biopsy;  Surgeon: Sid Waters MD;  Location: [facility];  Service: Gastroenterology       Current Meds:  Current Outpatient Medications   Medication Sig Dispense Refill    hydroxyurea (HYDREA) 500 MG capsule Take 500 mg by mouth every other day      losartan-hydrochlorothiazide (HYZAAR) 100-25 MG tablet Take 1 tablet by mouth daily      omeprazole (PRILOSEC) 20 MG DR capsule Take 20 mg by mouth daily    "   aminocaproic acid (AMICAR) 0.25 GM/ML solution Take 12 mLs (3 g) by mouth every 8 hours 236.5 mL 1         Allergies:  Allergies   Allergen Reactions    Morphine Nausea and Vomiting    Penicillins Rash       Social Hx:  Marital status: Lives with wife  Number of children: 3 children, 6 grandchildren   Resides in a house, built in 1958, no concern for mold.  Occupational history:    service: none  Pets: dog   Smoking history: 80 pack year history  Alcohol use: 3 whiskey drinks per day  Recreational drug use: previous marijuana   Recent Travel: none     Family HX: family history is not on file.    ROS:   ROS: 10-point review performed and notable for the above mentioned symptoms. The remainder reviewed and negative.     Physical Exam:  /68 (BP Location: Left arm, Patient Position: Chair, Cuff Size: Adult Regular)   Pulse 93   Ht 1.81 m (5' 11.25\")   Wt 89.4 kg (197 lb 3.2 oz)   SpO2 96%   BMI 27.31 kg/m      Physical Exam  Constitutional:       General: He is not in acute distress.     Appearance: He is not ill-appearing or diaphoretic.   Cardiovascular:      Rate and Rhythm: Normal rate and regular rhythm.      Heart sounds: Normal heart sounds.   Pulmonary:      Effort: No respiratory distress.      Breath sounds: No stridor. Rhonchi (faint, expiratory in bases) present. No wheezing.   Musculoskeletal:      Right lower leg: No edema.      Left lower leg: No edema.   Neurological:      Mental Status: He is alert.   Psychiatric:         Behavior: Behavior normal.         PFT's     1/4/2024      Impression:  Full Pulmonary Function Test is abnormal.  PFTs are consistent with mild obstructive disease.  Spirometry is not consistent with reversibility. There is improvement in the mid flow rates.   There is no hyperinflation.  There is air-trapping.  Diffusion capacity when corrected for hemoglobin is mildly reduced.    Labs:   personally reviewed in the EMR.    Imaging studies: " personally reviewed and interpreted. Below are the Radiology interpretations.    CT 12/5/2023  No pleural effusions, focal consolidations, or pneumothoraces.  Central airways are patent.  Left upper lobe subpleural nodule abutting the pleura measures 5 mm in size.  Right middle lobe solid pulmonary nodule measures 7 mm.  Right upper lobe apical nodule measures 3 to 4 mm.  Right middle lobe nodularity adjacent to the minor fissure measures 6 to 7 mm, may represent benign intrapulmonary lymph node.  Heart is normal in size without pericardial effusion or pericardial thickening.  Main pulmonary artery and thoracic aorta are normal in caliber.    Mildly enlarged bilateral hilar lymph nodes are present the largest of which is a right hilar lymph node measuring 1.6 cm.  Mildly enlarged subcarinal lymph node measures 1.1 cm.  CONCLUSION:  1. few bilateral solid pulmonary nodules, the largest of which measures up to 7 mm.  Follow-up CT chest in 3 months is recommended given provided history.    2. few mildly enlarged hilar and mediastinal lymph nodes.  Follow-up CT chest with IV contrast in 3 months is recommended.  Alternatively, given patient's reported history of malignancy, PET CT may now be considered, if indicated.

## 2024-01-06 LAB
DLCOCOR-%PRED-PRE: 63 %
DLCOCOR-PRE: 17.61 ML/MIN/MMHG
DLCOUNC-%PRED-PRE: 66 %
DLCOUNC-PRE: 18.44 ML/MIN/MMHG
DLCOUNC-PRED: 27.64 ML/MIN/MMHG
ERV-%PRED-PRE: 63 %
ERV-PRE: 1.01 L
ERV-PRED: 1.59 L
EXPTIME-PRE: 12.09 SEC
FEF2575-%PRED-POST: 47 %
FEF2575-%PRED-PRE: 32 %
FEF2575-POST: 1.25 L/SEC
FEF2575-PRE: 0.86 L/SEC
FEF2575-PRED: 2.63 L/SEC
FEFMAX-%PRED-PRE: 66 %
FEFMAX-PRE: 6.04 L/SEC
FEFMAX-PRED: 9.08 L/SEC
FEV1-%PRED-PRE: 71 %
FEV1-PRE: 2.33 L
FEV1FEV6-PRE: 65 %
FEV1FEV6-PRED: 78 %
FEV1FVC-PRE: 58 %
FEV1FVC-PRED: 77 %
FEV1SVC-PRE: 54 %
FEV1SVC-PRED: 74 %
FIFMAX-PRE: 2.61 L/SEC
FRCPLETH-%PRED-PRE: 119 %
FRCPLETH-PRE: 4.46 L
FRCPLETH-PRED: 3.73 L
FVC-%PRED-PRE: 93 %
FVC-PRE: 3.99 L
FVC-PRED: 4.26 L
IC-%PRED-PRE: 102 %
IC-PRE: 3.24 L
IC-PRED: 3.17 L
RVPLETH-%PRED-PRE: 131 %
RVPLETH-PRE: 3.39 L
RVPLETH-PRED: 2.58 L
TLCPLETH-%PRED-PRE: 104 %
TLCPLETH-PRE: 7.7 L
TLCPLETH-PRED: 7.38 L
VA-%PRED-PRE: 95 %
VA-PRE: 6.46 L
VC-%PRED-PRE: 96 %
VC-PRE: 4.31 L
VC-PRED: 4.47 L

## 2024-01-11 ENCOUNTER — MEDICAL CORRESPONDENCE (OUTPATIENT)
Dept: HEALTH INFORMATION MANAGEMENT | Facility: CLINIC | Age: 64
End: 2024-01-11
Payer: COMMERCIAL

## 2024-01-15 DIAGNOSIS — R06.09 DOE (DYSPNEA ON EXERTION): ICD-10-CM

## 2024-01-15 DIAGNOSIS — Z11.59 ENCOUNTER FOR SCREENING FOR OTHER VIRAL DISEASES: ICD-10-CM

## 2024-01-15 DIAGNOSIS — R06.02 SOB (SHORTNESS OF BREATH): ICD-10-CM

## 2024-01-15 DIAGNOSIS — I27.20 PULMONARY HTN (H): Primary | ICD-10-CM

## 2024-01-15 DIAGNOSIS — R79.9 ABNORMAL FINDING OF BLOOD CHEMISTRY, UNSPECIFIED: ICD-10-CM

## 2024-01-15 DIAGNOSIS — R79.1 ABNORMAL COAGULATION PROFILE: ICD-10-CM

## 2024-01-21 ENCOUNTER — HEALTH MAINTENANCE LETTER (OUTPATIENT)
Age: 64
End: 2024-01-21

## 2024-02-13 ENCOUNTER — OFFICE VISIT (OUTPATIENT)
Dept: CARDIOLOGY | Facility: CLINIC | Age: 64
End: 2024-02-13
Attending: INTERNAL MEDICINE
Payer: COMMERCIAL

## 2024-02-13 ENCOUNTER — PRE VISIT (OUTPATIENT)
Dept: CARDIOLOGY | Facility: CLINIC | Age: 64
End: 2024-02-13

## 2024-02-13 ENCOUNTER — LAB (OUTPATIENT)
Dept: LAB | Facility: CLINIC | Age: 64
End: 2024-02-13
Payer: COMMERCIAL

## 2024-02-13 VITALS
HEART RATE: 98 BPM | HEIGHT: 72 IN | DIASTOLIC BLOOD PRESSURE: 72 MMHG | SYSTOLIC BLOOD PRESSURE: 125 MMHG | BODY MASS INDEX: 27.21 KG/M2 | OXYGEN SATURATION: 96 % | WEIGHT: 200.9 LBS

## 2024-02-13 DIAGNOSIS — R79.1 ABNORMAL COAGULATION PROFILE: ICD-10-CM

## 2024-02-13 DIAGNOSIS — I27.20 PULMONARY HTN (H): ICD-10-CM

## 2024-02-13 DIAGNOSIS — R79.9 ABNORMAL FINDING OF BLOOD CHEMISTRY, UNSPECIFIED: ICD-10-CM

## 2024-02-13 DIAGNOSIS — R06.09 DOE (DYSPNEA ON EXERTION): ICD-10-CM

## 2024-02-13 DIAGNOSIS — R06.09 DYSPNEA ON EXERTION: ICD-10-CM

## 2024-02-13 DIAGNOSIS — Z11.59 ENCOUNTER FOR SCREENING FOR OTHER VIRAL DISEASES: ICD-10-CM

## 2024-02-13 LAB
ALBUMIN SERPL BCG-MCNC: 4.7 G/DL (ref 3.5–5.2)
ALP SERPL-CCNC: 55 U/L (ref 40–150)
ALT SERPL W P-5'-P-CCNC: 50 U/L (ref 0–70)
ANION GAP SERPL CALCULATED.3IONS-SCNC: 13 MMOL/L (ref 7–15)
AST SERPL W P-5'-P-CCNC: 44 U/L (ref 0–45)
BILIRUB DIRECT SERPL-MCNC: 0.26 MG/DL (ref 0–0.3)
BILIRUB SERPL-MCNC: 0.9 MG/DL
BUN SERPL-MCNC: 18.2 MG/DL (ref 8–23)
CALCIUM SERPL-MCNC: 9.8 MG/DL (ref 8.8–10.2)
CHLORIDE SERPL-SCNC: 98 MMOL/L (ref 98–107)
CHOLEST SERPL-MCNC: 170 MG/DL
CREAT SERPL-MCNC: 0.99 MG/DL (ref 0.67–1.17)
CRP SERPL-MCNC: 24.1 MG/L
DEPRECATED HCO3 PLAS-SCNC: 26 MMOL/L (ref 22–29)
DRVVT SCREEN RATIO: 1.01
EGFRCR SERPLBLD CKD-EPI 2021: 85 ML/MIN/1.73M2
ERYTHROCYTE [DISTWIDTH] IN BLOOD BY AUTOMATED COUNT: 13.8 % (ref 10–15)
FASTING STATUS PATIENT QL REPORTED: YES
GLUCOSE SERPL-MCNC: 106 MG/DL (ref 70–99)
HBV CORE AB SERPL QL IA: NONREACTIVE
HBV SURFACE AB SERPL IA-ACNC: <3.5 M[IU]/ML
HBV SURFACE AB SERPL IA-ACNC: NONREACTIVE M[IU]/ML
HBV SURFACE AG SERPL QL IA: NONREACTIVE
HCT VFR BLD AUTO: 49.2 % (ref 40–53)
HCV AB SERPL QL IA: NONREACTIVE
HDLC SERPL-MCNC: 54 MG/DL
HGB BLD-MCNC: 17.2 G/DL (ref 13.3–17.7)
HIV 1+2 AB+HIV1 P24 AG SERPL QL IA: NONREACTIVE
INR PPP: 1.07 (ref 0.85–1.15)
IRON BINDING CAPACITY (ROCHE): 270 UG/DL (ref 240–430)
IRON SATN MFR SERPL: 40 % (ref 15–46)
IRON SERPL-MCNC: 108 UG/DL (ref 61–157)
LA PPP-IMP: POSITIVE
LDLC SERPL CALC-MCNC: 90 MG/DL
LOCATION OF TASK: ABNORMAL
LUPUS INTERPRETATION: ABNORMAL
MCH RBC QN AUTO: 35.4 PG (ref 26.5–33)
MCHC RBC AUTO-ENTMCNC: 35 G/DL (ref 31.5–36.5)
MCV RBC AUTO: 101 FL (ref 78–100)
NONHDLC SERPL-MCNC: 116 MG/DL
NT-PROBNP SERPL-MCNC: <36 PG/ML (ref 0–900)
PLATELET # BLD AUTO: 487 10E3/UL (ref 150–450)
PLATELET NEUTRALIZATION: 1 SECONDS
POTASSIUM SERPL-SCNC: 3.8 MMOL/L (ref 3.4–5.3)
PROT SERPL-MCNC: 8.7 G/DL (ref 6.4–8.3)
PTT RATIO: 1.81
RBC # BLD AUTO: 4.86 10E6/UL (ref 4.4–5.9)
RHEUMATOID FACT SERPL-ACNC: <10 IU/ML
SODIUM SERPL-SCNC: 137 MMOL/L (ref 135–145)
THROMBIN TIME: 15.7 SECONDS (ref 13–19)
TRIGL SERPL-MCNC: 132 MG/DL
TSH SERPL DL<=0.005 MIU/L-ACNC: 1.4 UIU/ML (ref 0.3–4.2)
WBC # BLD AUTO: 14 10E3/UL (ref 4–11)

## 2024-02-13 PROCEDURE — 85613 RUSSELL VIPER VENOM DILUTED: CPT | Performed by: INTERNAL MEDICINE

## 2024-02-13 PROCEDURE — 83550 IRON BINDING TEST: CPT | Performed by: PATHOLOGY

## 2024-02-13 PROCEDURE — 83529 ASAY OF INTERLEUKIN-6 (IL-6): CPT | Performed by: PATHOLOGY

## 2024-02-13 PROCEDURE — 85027 COMPLETE CBC AUTOMATED: CPT | Performed by: PATHOLOGY

## 2024-02-13 PROCEDURE — 86140 C-REACTIVE PROTEIN: CPT | Performed by: PATHOLOGY

## 2024-02-13 PROCEDURE — 82248 BILIRUBIN DIRECT: CPT | Performed by: PATHOLOGY

## 2024-02-13 PROCEDURE — 36415 COLL VENOUS BLD VENIPUNCTURE: CPT | Performed by: PATHOLOGY

## 2024-02-13 PROCEDURE — 84443 ASSAY THYROID STIM HORMONE: CPT | Performed by: PATHOLOGY

## 2024-02-13 PROCEDURE — 83880 ASSAY OF NATRIURETIC PEPTIDE: CPT | Performed by: PATHOLOGY

## 2024-02-13 PROCEDURE — 84238 ASSAY NONENDOCRINE RECEPTOR: CPT | Mod: 90 | Performed by: PATHOLOGY

## 2024-02-13 PROCEDURE — 80053 COMPREHEN METABOLIC PANEL: CPT | Performed by: PATHOLOGY

## 2024-02-13 PROCEDURE — 86803 HEPATITIS C AB TEST: CPT | Performed by: INTERNAL MEDICINE

## 2024-02-13 PROCEDURE — G0463 HOSPITAL OUTPT CLINIC VISIT: HCPCS | Performed by: INTERNAL MEDICINE

## 2024-02-13 PROCEDURE — 86038 ANTINUCLEAR ANTIBODIES: CPT | Performed by: INTERNAL MEDICINE

## 2024-02-13 PROCEDURE — 85610 PROTHROMBIN TIME: CPT | Performed by: PATHOLOGY

## 2024-02-13 PROCEDURE — 85390 FIBRINOLYSINS SCREEN I&R: CPT | Mod: 26 | Performed by: PATHOLOGY

## 2024-02-13 PROCEDURE — 99000 SPECIMEN HANDLING OFFICE-LAB: CPT | Performed by: PATHOLOGY

## 2024-02-13 PROCEDURE — 87389 HIV-1 AG W/HIV-1&-2 AB AG IA: CPT | Performed by: INTERNAL MEDICINE

## 2024-02-13 PROCEDURE — 83540 ASSAY OF IRON: CPT | Performed by: PATHOLOGY

## 2024-02-13 PROCEDURE — 87340 HEPATITIS B SURFACE AG IA: CPT | Performed by: INTERNAL MEDICINE

## 2024-02-13 PROCEDURE — 86706 HEP B SURFACE ANTIBODY: CPT | Performed by: INTERNAL MEDICINE

## 2024-02-13 PROCEDURE — 86704 HEP B CORE ANTIBODY TOTAL: CPT | Performed by: INTERNAL MEDICINE

## 2024-02-13 PROCEDURE — 99205 OFFICE O/P NEW HI 60 MIN: CPT | Performed by: INTERNAL MEDICINE

## 2024-02-13 PROCEDURE — 86431 RHEUMATOID FACTOR QUANT: CPT | Performed by: INTERNAL MEDICINE

## 2024-02-13 PROCEDURE — 80061 LIPID PANEL: CPT | Performed by: PATHOLOGY

## 2024-02-13 ASSESSMENT — PAIN SCALES - GENERAL: PAINLEVEL: NO PAIN (0)

## 2024-02-13 NOTE — PROGRESS NOTES
"2024    Dear Colleagues,    I had the pleasure of seeing your patient Francisco Mac at the [facility] Pulmonary Hypertension clinic.  As you know, Ed is a please 63 year old male with history of COPD, hypertension, and hemophilia who presents for evaluation of dyspnea and potential pulmonary hypertension. Ed had issues with exertional dyspnea and had a work-up and he was found to have COPD. He was started on bronchodilators and he has had a remarkable improvement. He is now able to walk his dog and walk for over 1.5 miles without any problems. He denies any chest pain, pressure, and presyncope. He also has no issues with lower extremity edema. He can go up a flight of stairs without any problems. Overall, I would classify him as WHO FC 1.    He had PFTs that showed obstructive disease with a reduced DLCO. We had a discussion of further work up of testing, and because he is feeling so well he does not want to pursue anything further.       PAST MEDICAL HISTORY:  Past Medical History:   Diagnosis Date    Hemophilia (H)     \"Mild per pt\"    Hypertension        FAMILY HISTORY:  No family history on file.    SOCIAL HISTORY:  Social History     Socioeconomic History    Marital status:      Spouse name: None    Number of children: None    Years of education: None    Highest education level: None   Tobacco Use    Smoking status: Former     Packs/day: 2.00     Years: 40.00     Additional pack years: 0.00     Total pack years: 80.00     Types: Cigarettes     Quit date: 2017     Years since quittin.4     Passive exposure: Past    Smokeless tobacco: Never   Vaping Use    Vaping Use: Never used   Substance and Sexual Activity    Alcohol use: Yes     Alcohol/week: 15.0 standard drinks of alcohol     Types: 15 Standard drinks or equivalent per week     Comment: 2-3 whiskey per night    Drug use: Yes     Types: Marijuana       CURRENT MEDICATIONS:  Current Outpatient Medications " "  Medication Sig Dispense Refill    albuterol (PROAIR HFA/PROVENTIL HFA/VENTOLIN HFA) 108 (90 Base) MCG/ACT inhaler Inhale 2 puffs into the lungs every 6 hours as needed for shortness of breath, wheezing or cough 18 g 3    hydroxyurea (HYDREA) 500 MG capsule Take 500 mg by mouth every other day      losartan-hydrochlorothiazide (HYZAAR) 100-25 MG tablet Take 1 tablet by mouth daily      tiotropium (SPIRIVA) 18 MCG inhaled capsule Inhale 1 capsule (18 mcg) into the lungs daily 30 capsule 11    omeprazole (PRILOSEC) 20 MG DR capsule Take 20 mg by mouth daily (Patient not taking: Reported on 2/13/2024)         ROS:   10 point ROS negative except HPI    EXAM:  /72 (BP Location: Left arm, Patient Position: Sitting, Cuff Size: Adult Regular)   Pulse 98   Ht 1.82 m (5' 11.65\")   Wt 91.1 kg (200 lb 14.4 oz)   SpO2 96%   BMI 27.51 kg/m    General: appears comfortable, alert and articulate  Head: normocephalic, atraumatic  Eyes: anicteric sclera, EOMI  Neck: no adenopathy  Orophyarynx: moist mucosa, no lesions, dentition intact  Heart: regular, S1/S2, no murmur, gallop, rub, estimated JVP 7 cm  Lungs: clear, no rales or wheezing  Abdomen: soft, non-tender,  Extremities: no clubbing, cyanosis or edema  Neurological: normal speech and affect, no gross motor deficits    Labs:  CBC RESULTS:  Lab Results   Component Value Date    WBC 14.0 (H) 02/13/2024    RBC 4.86 02/13/2024    HGB 17.2 02/13/2024    HCT 49.2 02/13/2024     (H) 02/13/2024    MCH 35.4 (H) 02/13/2024    MCHC 35.0 02/13/2024    RDW 13.8 02/13/2024     (H) 02/13/2024       CMP RESULTS:  Lab Results   Component Value Date     09/15/2023    POTASSIUM 4.1 09/15/2023    CHLORIDE 101 09/15/2023    CO2 24 09/15/2023    ANIONGAP 14 09/15/2023    GLC 85 09/15/2023    BUN 16.8 09/15/2023    CR 1.01 09/15/2023    GFRESTIMATED 83 09/15/2023    AGUILA 9.7 09/15/2023    BILITOTAL 1.0 09/15/2023    ALBUMIN 4.4 09/15/2023    ALKPHOS 50 09/15/2023    ALT " 29 09/15/2023    AST 24 09/15/2023        Echocardiogram 11/23:      PFTs 1/24        Assessment and Plan: Francisco Mac is a 63 year old male with COPD who presents for evaluation of COPD.    Dyspnea in setting of COPD: Ed's dyspnea has improved with COPD treatment. He has no concerns currently and he is WHO FC 1 currently. We discussed the possibility of doing a further work up but he declined. I think that is reasonable and instructed him to follow up with us if anything changes. If he had PH, it would be most likely be due to his COPD. At present, we have no treatments for COPD-PH so it would be more of an academic work-up anyways.     It was a pleasure seeing your patient Francisco Mac at the [facility] Pulmonary Hypertension clinic.  Please contact us with any questions or concerns that you may have.    Sincerely,    Roberto Polanco MD, PhD, FAHA  Associate Professor of Medicine  Director, Chronic Thromboembolic Pulmonary Hypertension Program  Cardiovascular Division      I spent a total of 60 minutes on the date of service evaluating this patient which included face to face discussion, performing a physical exam, reviewing of the chart to gain information from other providers to obtain further history, personally reviewing echocardiograms, CT scans showing pulmonary nodulates, pulmonary function tests showing reduced DLCO, ordering tests and/or medications, and documenting clinical information in the electronic health record.

## 2024-02-13 NOTE — LETTER
"2024      RE: Francisco Mac  [address] [place] [address]       Dear Colleague,    Thank you for the opportunity to participate in the care of your patient, Francisco Mac, at the [facility] HEART [facility]IC [place] [facility]. Please see a copy of my visit note below.    2024    Dear Colleagues,    I had the pleasure of seeing your patient Francisco Mac at the [facility] Pulmonary Hypertension clinic.  As you know, Ed is a please 63 year old male with history of COPD, hypertension, and hemophilia who presents for evaluation of dyspnea and potential pulmonary hypertension. Ed had issues with exertional dyspnea and had a work-up and he was found to have COPD. He was started on bronchodilators and he has had a remarkable improvement. He is now able to walk his dog and walk for over 1.5 miles without any problems. He denies any chest pain, pressure, and presyncope. He also has no issues with lower extremity edema. He can go up a flight of stairs without any problems. Overall, I would classify him as WHO FC 1.    He had PFTs that showed obstructive disease with a reduced DLCO. We had a discussion of further work up of testing, and because he is feeling so well he does not want to pursue anything further.       PAST MEDICAL HISTORY:  Past Medical History:   Diagnosis Date     Hemophilia (H)     \"Mild per pt\"     Hypertension        FAMILY HISTORY:  No family history on file.    SOCIAL HISTORY:  Social History     Socioeconomic History     Marital status:      Spouse name: None     Number of children: None     Years of education: None     Highest education level: None   Tobacco Use     Smoking status: Former     Packs/day: 2.00     Years: 40.00     Additional pack years: 0.00     Total pack years: 80.00     Types: Cigarettes     Quit date: 2017     Years since quittin.4     Passive exposure: Past     " "Smokeless tobacco: Never   Vaping Use     Vaping Use: Never used   Substance and Sexual Activity     Alcohol use: Yes     Alcohol/week: 15.0 standard drinks of alcohol     Types: 15 Standard drinks or equivalent per week     Comment: 2-3 whiskey per night     Drug use: Yes     Types: Marijuana       CURRENT MEDICATIONS:  Current Outpatient Medications   Medication Sig Dispense Refill     albuterol (PROAIR HFA/PROVENTIL HFA/VENTOLIN HFA) 108 (90 Base) MCG/ACT inhaler Inhale 2 puffs into the lungs every 6 hours as needed for shortness of breath, wheezing or cough 18 g 3     hydroxyurea (HYDREA) 500 MG capsule Take 500 mg by mouth every other day       losartan-hydrochlorothiazide (HYZAAR) 100-25 MG tablet Take 1 tablet by mouth daily       tiotropium (SPIRIVA) 18 MCG inhaled capsule Inhale 1 capsule (18 mcg) into the lungs daily 30 capsule 11     omeprazole (PRILOSEC) 20 MG DR capsule Take 20 mg by mouth daily (Patient not taking: Reported on 2/13/2024)         ROS:   10 point ROS negative except HPI    EXAM:  /72 (BP Location: Left arm, Patient Position: Sitting, Cuff Size: Adult Regular)   Pulse 98   Ht 1.82 m (5' 11.65\")   Wt 91.1 kg (200 lb 14.4 oz)   SpO2 96%   BMI 27.51 kg/m    General: appears comfortable, alert and articulate  Head: normocephalic, atraumatic  Eyes: anicteric sclera, EOMI  Neck: no adenopathy  Orophyarynx: moist mucosa, no lesions, dentition intact  Heart: regular, S1/S2, no murmur, gallop, rub, estimated JVP 7 cm  Lungs: clear, no rales or wheezing  Abdomen: soft, non-tender,  Extremities: no clubbing, cyanosis or edema  Neurological: normal speech and affect, no gross motor deficits    Labs:  CBC RESULTS:  Lab Results   Component Value Date    WBC 14.0 (H) 02/13/2024    RBC 4.86 02/13/2024    HGB 17.2 02/13/2024    HCT 49.2 02/13/2024     (H) 02/13/2024    MCH 35.4 (H) 02/13/2024    MCHC 35.0 02/13/2024    RDW 13.8 02/13/2024     (H) 02/13/2024       CMP RESULTS:  Lab " Results   Component Value Date     09/15/2023    POTASSIUM 4.1 09/15/2023    CHLORIDE 101 09/15/2023    CO2 24 09/15/2023    ANIONGAP 14 09/15/2023    GLC 85 09/15/2023    BUN 16.8 09/15/2023    CR 1.01 09/15/2023    GFRESTIMATED 83 09/15/2023    AGUILA 9.7 09/15/2023    BILITOTAL 1.0 09/15/2023    ALBUMIN 4.4 09/15/2023    ALKPHOS 50 09/15/2023    ALT 29 09/15/2023    AST 24 09/15/2023        Echocardiogram 11/23:      PFTs 1/24        Assessment and Plan: Francisco Mac is a 63 year old male with COPD who presents for evaluation of COPD.    Dyspnea in setting of COPD: Ed's dyspnea has improved with COPD treatment. He has no concerns currently and he is WHO FC 1 currently. We discussed the possibility of doing a further work up but he declined. I think that is reasonable and instructed him to follow up with us if anything changes. If he had PH, it would be most likely be due to his COPD. At present, we have no treatments for COPD-PH so it would be more of an academic work-up anyways.     It was a pleasure seeing your patient Francisco Mac at the [facility] Pulmonary Hypertension clinic.  Please contact us with any questions or concerns that you may have.    Sincerely,    Roberto Polanco MD, PhD, FAHA  Associate Professor of Medicine  Director, Chronic Thromboembolic Pulmonary Hypertension Program  Cardiovascular Division      I spent a total of 60 minutes on the date of service evaluating this patient which included face to face discussion, performing a physical exam, reviewing of the chart to gain information from other providers to obtain further history, personally reviewing echocardiograms, CT scans showing pulmonary nodulates, pulmonary function tests showing reduced DLCO, ordering tests and/or medications, and documenting clinical information in the electronic health record.                Please do not hesitate to contact me if you have any questions/concerns.     Sincerely,     Roberto Escobedo  MD [place]

## 2024-02-13 NOTE — NURSING NOTE
Chief Complaint   Patient presents with    New Patient     New pulmonary hypertension        Vitals were taken, medications reconciled.    Edelmira Cunha CMA  6:52 AM

## 2024-02-13 NOTE — PATIENT INSTRUCTIONS
You were seen today in the Pulmonary Hypertension Clinic at the [facility].     Cardiology Provider you saw during your visit:    Dr. Polanco    Medication Changes:  - None    Results:       Recommendations:   - Continue to use your inhaler as prescribed    Follow-up:   - Follow up with our clinic as needed    Please call us immediately if you have any syncope (fainting or passing out), chest pain, edema (swelling or weight gain), or decline in your functional status (general decline in how you are feeling).    If you have emergent concerns after hours or on the weekend, please call our on-call Cardiologist at [phone], option [phone]. For emergencies call [phone].     Thank you for allowing us to be a part of your care here at the [facility] Heart Care    If you have questions or concerns please contact us at:    Edelmira Cabral RN (P: [phone])    Nurse Coordinator       Pulmonary Hypertension     [facility] Heart [facility]         SIDNEY Garcia   (Prior Auths & Pt Assistance)   ()  Clinic   Clinic   Pulmonary Hypertension   Pulmonary Hypertension  [facility] Heart [facility]  (P)[phone]    (P) [phone]  (F) [phone]

## 2024-02-13 NOTE — TELEPHONE ENCOUNTER
Patient Name: Francisco Mac Age: 63 year old, male, 1960 MRN: [id number]   Referring Provider:  Fior Muñoz CNP Appt:  02/13/24        PH Medications:  none      Patient History: Pt has a history of  former smoker, with history of polycythemia vera, COPD, HTN, and hemophilia     **increased dyspnea, decreased DLCO. stress echo with elevated RV pressure 38 mmHg.       Recent Testing:       Date Test Epic Media/Scan Care Everywhere    12/28/23 Stress Echo   [x]  []   []       []  []   []     11/13/23 EKG        SR 93 [x]   []   []      6MWT       Meters/Lowest Sa02                      Max HR []   []   []     1/4/24 PFT              FEV1/FVC  is reduced at 58     FEV1 is reduced at 71% predicted     FVC is normal     There was no improvement in spirometry after a single inhaled dose of bronchodilator     TLC is normal     DLCO is reduced at 63% predicted when it is corrected for hemoglobin.  [x]   []   []      Sleep Study   []  []   []     12/5/23 Chest CT     [x]  []   []      V/Q Scan []   []   []      Abd/Liver US []   []   []      Misc:  []   []   []         []   []   []

## 2024-02-14 LAB
ANA SER QL IF: NEGATIVE
STFR SERPL-MCNC: 2.6 MG/L

## 2024-02-15 ENCOUNTER — OFFICE VISIT (OUTPATIENT)
Dept: PULMONOLOGY | Facility: CLINIC | Age: 64
End: 2024-02-15
Attending: NURSE PRACTITIONER
Payer: COMMERCIAL

## 2024-02-15 VITALS
HEART RATE: 109 BPM | DIASTOLIC BLOOD PRESSURE: 60 MMHG | WEIGHT: 205 LBS | BODY MASS INDEX: 28.07 KG/M2 | SYSTOLIC BLOOD PRESSURE: 120 MMHG | OXYGEN SATURATION: 96 %

## 2024-02-15 DIAGNOSIS — J44.9 CHRONIC OBSTRUCTIVE PULMONARY DISEASE, UNSPECIFIED COPD TYPE (H): Primary | ICD-10-CM

## 2024-02-15 DIAGNOSIS — R06.09 DYSPNEA ON EXERTION: ICD-10-CM

## 2024-02-15 DIAGNOSIS — R91.8 PULMONARY NODULES: ICD-10-CM

## 2024-02-15 LAB — IL6 SERPL-MCNC: 6.64 PG/ML

## 2024-02-15 PROCEDURE — 99214 OFFICE O/P EST MOD 30 MIN: CPT | Performed by: NURSE PRACTITIONER

## 2024-02-15 NOTE — PATIENT INSTRUCTIONS
It was a pleasure seeing you in clinic today. This is what we discussed:    Check to see if Spiriva Respimat (different form of Spiriva) or Incruse is any cheaper than the inhaler you have.   Use your albuterol every 4 hours as needed for cough, shortness of breath, wheeze, or chest tightness.   Follow up in 6 months   If you have worsening symptoms, questions, or need to speak with the nurse please call [phone].

## 2024-02-15 NOTE — PROGRESS NOTES
Pulmonary Outpatient Consult Note  February 15, 2024      Assessment and Plan:   Francisco Mac is a 63 year old male, former smoker, with history of polycythemia vera, HTN, and hemophilia presenting today for evaluation of cough and dyspnea.   Reported dyspnea on exertion and productive cough that significantly improved with Spiriva. No longer wakes at night to clear sputum. Also reports unintentional 15 pound weight loss over the past few months with bloating and frequent burping.  Denies reflux. Previous EGD in 2019 was normal.  History of polycythemia vera and has planned follow-up with provider at [facility] for this.  PFTs show mild obstruction, no significant bronchodilator response.  Air trapping.  And mildly decreased diffusing capacity.    Clinic walk did not demonstrate desaturation with activity. Lung exam normal today.   Recent CT chest shows 7 mm nodule that has increased in size from 2011, left upper lobe 5 mm nodule that has been stable since 2011, and the right lung nodule measuring 6 to 7 mm.  Enlarged right hilar and subcarinal lymph node.  Stress echo on 11/16 shows no evidence of ischemia or infarction.  There is mild tricuspid regurgitation with RV systolic pressure of 38 mmHg and mildly elevated right arterial pressure.    #. Dyspnea with exertion: Given obstructive ratio with emphysema noted on recent CT chest this could be related to COPD.  Unsure if cardiac component given echocardiogram results.   Cardiology referral to discuss possibility of cardiac contribution to shortness of breath.  Will trial inhaler for treatment of COPD.  Encouraged continued activity as able.  #. Cough, COPD: Will trial LAMA inhaler.  If not helpful or if symptoms persist consider adding LABA.  Spiriva or Incruse, whichever is covered by insurance.  1 puff daily.  We talked about using this every day no matter what until follow-up.  Trial albuterol prior to activity and as needed for cough.  #.  Lung nodules:  Given smoking history, enlarged lymph nodes, and weight loss we will plan for recommended CT with contrast with follow-up with MD in clinic.  CT chest with contrast in 3 months.  Ordered today.  #. HCM: Up-to-date with initial COVID series, needs booster.  Recommend seasonal flu, RSV, updated COVID booster, and pneumococcal vaccines if not already done.    If his symptoms exacerbate: prednisone 40mg daily x 5 days. If sputum amount or thickness increased add azithromycin zpak x 5 days.     RTC 6 months. Has planned follow up with MD after next CT scan in April.     Fior Muñoz, CNP  Pulmonary Medicine  ______________________________________________________________________________    CC:   Chief Complaint   Patient presents with    Follow Up     COPD and inhaler check        HPI:   Sid was last seen in clinic on 1/3/2024. At that time we started Spiriva.   He reports good relief and is happy with his breathing. Has not been needing albuterol.   Less sputum and cough. No nighttime wakings.   Denies wheeze or chest tightness.     Had SOB, cough, and burping that started 3 months ago. Unsure if SOB was progressive, noticed it one day.    [place] and stairs are hard for him but he walks his dog for 1 mile daily. Improved since on Spiriva.   Has been smoking marijuana occasionally. Notices his breathing is worse after smoking. Stop smoking in 2019, he has a 80-pack-year history.  Recent stress echo normal, no evidence of ischemia or infarction.  There was mild tricuspid regurgitation with RV systolic pressure of 38 mmHg and mildly elevated right arterial pressure. Denies edema.   Has lost 15lbs in the past few months.     He has had a EGD that was normal.  Followed by heme-onc at [facility] for polycythemia vera, plans for lab work with follow-up.  On hydroxyurea.  Has throat cancer history noted on chart but patient states this is incorrect. His wife had throat cancer last year and is in remission.     Exposure history:  [employer] work.  Reports regular spirometry testing at work that was normal in the past.    PMH:  Patient Active Problem List    Diagnosis Date Noted    Polycythemia vera (H)      Priority: Medium     Created by Conversion        [place] 04/10/2019     Priority: Medium    Colon cancer screening 04/10/2019     Priority: Medium    Laryngeal Cancer      Priority: Medium     Created by Conversion  Replacement Utility updated for latest IMO load        Monoclonal Gammopathy Of Undetermined Significance      Priority: Medium     Created by Conversion        Iron Deficiency      Priority: Medium     Created by Conversion        Myelodysplasia      Priority: Medium     Created by Conversion        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      Priority: Medium     Created by Conversion         PSH:  Past Surgical History:   Procedure Laterality Date    COLONOSCOPY N/A 4/10/2019    Procedure: COLONOSCOPY;  Surgeon: Sid Waters MD;  Location: [facility];  Service: Gastroenterology    ESOPHAGOSCOPY, GASTROSCOPY, DUODENOSCOPY (EGD), COMBINED N/A 12/26/2023    Procedure: ESOPHAGOGASTRODUODENOSCOPY WITH BIOPSIES;  Surgeon: Chandra Shukla MD;  Location: [facility] Main OR    OTHER SURGICAL HISTORY Left     left shoulder surgeryshoulder cleaned up and bicep cut    OTHER SURGICAL HISTORY  01/01/2009    abdominal vessel surgeryvessel from kidney to spleen ruptured, surgical repaired    UT ESOPHAGOGASTRODUODENOSCOPY TRANSORAL DIAGNOSTIC N/A 4/10/2019    Procedure: ESOPHAGOGASTRODUODENOSCOPY with biopsy;  Surgeon: Sid Waters MD;  Location: [facility];  Service: Gastroenterology     Current Meds:  Current Outpatient Medications   Medication Sig Dispense Refill    albuterol (PROAIR HFA/PROVENTIL HFA/VENTOLIN HFA) 108 (90 Base) MCG/ACT inhaler Inhale 2 puffs into the lungs every 6 hours as needed for shortness of breath, wheezing or cough 18 g 3    hydroxyurea (HYDREA) 500 MG capsule Take 500 mg by mouth  every other day      losartan-hydrochlorothiazide (HYZAAR) 100-25 MG tablet Take 1 tablet by mouth daily      tiotropium (SPIRIVA) 18 MCG inhaled capsule Inhale 1 capsule (18 mcg) into the lungs daily 30 capsule 11    omeprazole (PRILOSEC) 20 MG DR capsule Take 20 mg by mouth daily (Patient not taking: Reported on 2/13/2024)       Allergies:  Allergies   Allergen Reactions    Morphine Nausea and Vomiting    Penicillins Rash     Social Hx:  Marital status: Lives with wife  Number of children: 3 children, 6 grandchildren   Resides in a house, built in [place], no concern for mold.  Occupational history:    service: none  Pets: dog   Smoking history: 80 pack year history  Alcohol use: 3 whiskey drinks per day  Recreational drug use: previous marijuana   Recent Travel: none     Family HX: family history is not on file.    ROS:   ROS: 10-point review performed and notable for the above mentioned symptoms. The remainder reviewed and negative.     Physical Exam:  /60 (BP Location: Left arm, Patient Position: Chair, Cuff Size: Adult Regular)   Pulse 109   Wt 93 kg (205 lb)   SpO2 96%   BMI 28.07 kg/m      Physical Exam  Constitutional:       General: He is not in acute distress.     Appearance: He is not ill-appearing or diaphoretic.   Cardiovascular:      Rate and Rhythm: Normal rate and regular rhythm.      Heart sounds: Normal heart sounds.   Pulmonary:      Effort: Pulmonary effort is normal. No respiratory distress.      Breath sounds: No stridor. No wheezing or rhonchi.   Musculoskeletal:      Right lower leg: No edema.      Left lower leg: No edema.   Neurological:      Mental Status: He is alert.   Psychiatric:         Behavior: Behavior normal.         PFT's     1/4/2024      Impression:  Full Pulmonary Function Test is abnormal.  PFTs are consistent with mild obstructive disease.  Spirometry is not consistent with reversibility. There is improvement in the mid flow rates.   There is no  hyperinflation.  There is air-trapping.  Diffusion capacity when corrected for hemoglobin is mildly reduced.    Labs:   personally reviewed in the EMR.    Imaging studies: personally reviewed and interpreted. Below are the Radiology interpretations.    CT 12/5/2023  No pleural effusions, focal consolidations, or pneumothoraces.  Central airways are patent.  Left upper lobe subpleural nodule abutting the pleura measures 5 mm in size.  Right middle lobe solid pulmonary nodule measures 7 mm.  Right upper lobe apical nodule measures 3 to 4 mm.  Right middle lobe nodularity adjacent to the minor fissure measures 6 to 7 mm, may represent benign intrapulmonary lymph node.  Heart is normal in size without pericardial effusion or pericardial thickening.  Main pulmonary artery and thoracic aorta are normal in caliber.    Mildly enlarged bilateral hilar lymph nodes are present the largest of which is a right hilar lymph node measuring 1.6 cm.  Mildly enlarged subcarinal lymph node measures 1.1 cm.  CONCLUSION:  1. few bilateral solid pulmonary nodules, the largest of which measures up to 7 mm.  Follow-up CT chest in 3 months is recommended given provided history.    2. few mildly enlarged hilar and mediastinal lymph nodes.  Follow-up CT chest with IV contrast in 3 months is recommended.  Alternatively, given patient's reported history of malignancy, PET CT may now be considered, if indicated.

## 2024-03-01 ENCOUNTER — LAB REQUISITION (OUTPATIENT)
Dept: LAB | Facility: CLINIC | Age: 64
End: 2024-03-01
Payer: COMMERCIAL

## 2024-03-01 DIAGNOSIS — Z12.5 ENCOUNTER FOR SCREENING FOR MALIGNANT NEOPLASM OF PROSTATE: ICD-10-CM

## 2024-03-01 DIAGNOSIS — I10 ESSENTIAL (PRIMARY) HYPERTENSION: ICD-10-CM

## 2024-03-01 PROCEDURE — 80053 COMPREHEN METABOLIC PANEL: CPT | Mod: ORL | Performed by: FAMILY MEDICINE

## 2024-03-01 PROCEDURE — G0103 PSA SCREENING: HCPCS | Mod: ORL | Performed by: FAMILY MEDICINE

## 2024-03-02 LAB
ALBUMIN SERPL BCG-MCNC: 4.5 G/DL (ref 3.5–5.2)
ALP SERPL-CCNC: 54 U/L (ref 40–150)
ALT SERPL W P-5'-P-CCNC: 59 U/L (ref 0–70)
ANION GAP SERPL CALCULATED.3IONS-SCNC: 14 MMOL/L (ref 7–15)
AST SERPL W P-5'-P-CCNC: 45 U/L (ref 0–45)
BILIRUB SERPL-MCNC: 0.8 MG/DL
BUN SERPL-MCNC: 13.4 MG/DL (ref 8–23)
CALCIUM SERPL-MCNC: 9.4 MG/DL (ref 8.8–10.2)
CHLORIDE SERPL-SCNC: 101 MMOL/L (ref 98–107)
CREAT SERPL-MCNC: 0.95 MG/DL (ref 0.67–1.17)
DEPRECATED HCO3 PLAS-SCNC: 22 MMOL/L (ref 22–29)
EGFRCR SERPLBLD CKD-EPI 2021: 89 ML/MIN/1.73M2
GLUCOSE SERPL-MCNC: 96 MG/DL (ref 70–99)
POTASSIUM SERPL-SCNC: 4.2 MMOL/L (ref 3.4–5.3)
PROT SERPL-MCNC: 7.8 G/DL (ref 6.4–8.3)
PSA SERPL DL<=0.01 NG/ML-MCNC: 0.59 NG/ML (ref 0–4.5)
SODIUM SERPL-SCNC: 137 MMOL/L (ref 135–145)

## 2024-04-08 ENCOUNTER — HOSPITAL ENCOUNTER (OUTPATIENT)
Dept: CT IMAGING | Facility: HOSPITAL | Age: 64
Discharge: HOME | End: 2024-04-08
Attending: NURSE PRACTITIONER | Admitting: NURSE PRACTITIONER
Payer: COMMERCIAL

## 2024-04-08 DIAGNOSIS — R91.8 PULMONARY NODULES: ICD-10-CM

## 2024-04-08 DIAGNOSIS — R59.0 HILAR LYMPHADENOPATHY: ICD-10-CM

## 2024-04-08 PROCEDURE — 71260 CT THORAX DX C+: CPT

## 2024-04-08 PROCEDURE — 250N000011 HC RX IP 250 OP 636: Performed by: NURSE PRACTITIONER

## 2024-04-08 PROCEDURE — 250N000009 HC RX 250: Performed by: NURSE PRACTITIONER

## 2024-04-08 RX ORDER — IOPAMIDOL 755 MG/ML
90 INJECTION, SOLUTION INTRAVASCULAR ONCE
Status: COMPLETED | OUTPATIENT
Start: 2024-04-08 | End: 2024-04-08

## 2024-04-08 RX ADMIN — SODIUM CHLORIDE 90 ML: 9 INJECTION, SOLUTION INTRAVENOUS at 08:02

## 2024-04-08 RX ADMIN — IOPAMIDOL 90 ML: 755 INJECTION, SOLUTION INTRAVENOUS at 07:57

## 2024-04-25 ENCOUNTER — OFFICE VISIT (OUTPATIENT)
Dept: PULMONOLOGY | Facility: CLINIC | Age: 64
End: 2024-04-25
Attending: NURSE PRACTITIONER
Payer: COMMERCIAL

## 2024-04-25 VITALS
DIASTOLIC BLOOD PRESSURE: 64 MMHG | OXYGEN SATURATION: 95 % | BODY MASS INDEX: 27.94 KG/M2 | WEIGHT: 204 LBS | HEART RATE: 84 BPM | SYSTOLIC BLOOD PRESSURE: 118 MMHG

## 2024-04-25 DIAGNOSIS — R91.8 PULMONARY NODULES: ICD-10-CM

## 2024-04-25 DIAGNOSIS — J44.9 CHRONIC OBSTRUCTIVE PULMONARY DISEASE, UNSPECIFIED COPD TYPE (H): Primary | ICD-10-CM

## 2024-04-25 PROCEDURE — 99214 OFFICE O/P EST MOD 30 MIN: CPT | Performed by: INTERNAL MEDICINE

## 2024-04-25 NOTE — PROGRESS NOTES
Pulmonary Outpatient Consult Note  04/25/2024    Assessment and Plan:   Francisco Mac is a 63 year old male, former smoker, with history of polycythemia vera, HTN, and hemophilia presenting today for follow-up visit for his mild COPD.  He has had improvement in his symptoms since initiation of his long-acting bronchodilator.    Mild COPD: Noted on pulmonary function testing and with emphysema on imaging.  Patient has had significant improvement in his symptoms with initiation of LAMA and is almost not needing to use his albuterol inhaler for rescue at all.  He continues to smoke marijuana 2-3 times a week I did discuss with him that this could exacerbate his symptoms.  Continue Spiriva 1 puff daily.  As needed albuterol for rescue.  Explained to him how smoking marijuana could trigger his COPD but he is not interested in quitting as he does not get as high with the Gummies that he does with marijuana joints.  COPD action plan: Prednisone 40mg daily x 5 days and Doxycline 100mg BID x 5 days.   Pulmonary nodules: Noted on CT and a high risk patient.  His follow-up CT scan performed earlier this month raises the concerns for prominent intrapulmonary lymph nodes and a slight increase in the size of his right middle lobe nodule.  While this imaging appears to be benign on the whole is a recommendation for repeat imaging in 3 months which the patient has expressed no interest in pursuing.  He states that even if he were to find lung cancer he would not want to take any treatment for this.  HCM: Has only completed Covid Vaccination and does not believe in other vaccines.  Follow-up: Already scheduled with Fior Muñoz CNP in August.  No further need to be evaluated by an MD in the pulmonary clinic.    Talia Cardozo MD  Pulmonary and Critical Care  (P) [phone]  ______________________________________________________________________________    CC: Follow-up COPD      HPI: Mr. Mac is a 63 year old gentleman with  a past medical history significant for hemophilia (per patient), HTN, polycythemia, MGUS, myelodysplasia who presents to clinic for the aforementioned chief complaint.  Since his last clinic visit, he has been using his Spiriva inhaler once a day and this is significantly alleviated his symptoms.  He hardly ever uses his rescue inhaler (a once or twice a week).  He continues to cough up thick phlegm in the morning and some throughout the day.  He denies chest pain, chest tightness, fevers, chills, night sweats.  He continues to smoke marijuana 2-3 times a week and does feel winded after using this.    Exposure history: [employer] work.  Reports regular spirometry testing at work that was normal in the past.    PMH:  Reviewed.    Current Meds:  Current Outpatient Medications   Medication Sig Dispense Refill    albuterol (PROAIR HFA/PROVENTIL HFA/VENTOLIN HFA) 108 (90 Base) MCG/ACT inhaler Inhale 2 puffs into the lungs every 6 hours as needed for shortness of breath, wheezing or cough 18 g 3    hydroxyurea (HYDREA) 500 MG capsule Take 500 mg by mouth every other day      losartan-hydrochlorothiazide (HYZAAR) 100-25 MG tablet Take 1 tablet by mouth daily      tiotropium (SPIRIVA) 18 MCG inhaled capsule Inhale 1 capsule (18 mcg) into the lungs daily 30 capsule 11    omeprazole (PRILOSEC) 20 MG DR capsule Take 20 mg by mouth daily (Patient not taking: Reported on 2/13/2024)       Allergies:  Allergies   Allergen Reactions    Morphine Nausea and Vomiting    Penicillins Rash     Social Hx:  Marital status: Lives with wife  Number of children: 3 children, 6 grandchildren   Resides in a house, built in [place], no concern for mold.  Occupational history:    service: none  Pets: dog   Smoking history: 80 pack year history  Alcohol use: 3 whiskey drinks per day  Recreational drug use: Continues to smoke marijuana 3*/week  Recent Travel: none     Family HX: family history is not on file.    ROS:   ROS: 10-point  review performed and notable for the above mentioned symptoms. The remainder reviewed and negative.     Physical Exam:  /64 (BP Location: Right arm, Patient Position: Chair, Cuff Size: Adult Regular)   Pulse 84   Wt 92.5 kg (204 lb)   SpO2 95%   BMI 27.94 kg/m      Physical Exam  Constitutional:       General: He is not in acute distress.     Appearance: He is not ill-appearing or diaphoretic.   Cardiovascular:      Rate and Rhythm: Normal rate and regular rhythm.      Heart sounds: Normal heart sounds.   Pulmonary:      Effort: Pulmonary effort is normal. No respiratory distress.      Breath sounds: No stridor. No wheezing or rhonchi.   Musculoskeletal:      Right lower leg: No edema.      Left lower leg: No edema.   Neurological:      Mental Status: He is alert.   Psychiatric:         Behavior: Behavior normal.         PFT's     1/4/2024      Impression:  Full Pulmonary Function Test is abnormal.  PFTs are consistent with mild obstructive disease.  Spirometry is not consistent with reversibility. There is improvement in the mid flow rates.   There is no hyperinflation.  There is air-trapping.  Diffusion capacity when corrected for hemoglobin is mildly reduced.    Labs:   personally reviewed in the EMR.    Imaging studies: personally reviewed and interpreted. Below are the Radiology interpretations.  CT Chest w/ Contrast 4/8/24:  1.  Multiple bilateral pulmonary nodules, some of which may represent prominent intrapulmonary lymph nodes. The dominant 8 mm right middle lobe nodule is slightly increased in size. The other nodules are stable. Recommend additional 3 month chest CT   follow-up.  2.  Stable borderline enlarged hilar lymph nodes which are favored to be reactive. Recommend attention on follow-up.    CT 12/5/2023  1. few bilateral solid pulmonary nodules, the largest of which measures up to 7 mm.  Follow-up CT chest in 3 months is recommended given provided history.    2. few mildly enlarged hilar  and mediastinal lymph nodes.  Follow-up CT chest with IV contrast in 3 months is recommended.  Alternatively, given patient's reported history of malignancy, PET CT may now be considered, if indicated.

## 2024-08-15 ENCOUNTER — OFFICE VISIT (OUTPATIENT)
Dept: PULMONOLOGY | Facility: CLINIC | Age: 64
End: 2024-08-15
Attending: NURSE PRACTITIONER
Payer: COMMERCIAL

## 2024-08-15 VITALS
OXYGEN SATURATION: 97 % | DIASTOLIC BLOOD PRESSURE: 64 MMHG | BODY MASS INDEX: 27.39 KG/M2 | SYSTOLIC BLOOD PRESSURE: 120 MMHG | WEIGHT: 200 LBS | HEART RATE: 70 BPM

## 2024-08-15 DIAGNOSIS — J44.9 CHRONIC OBSTRUCTIVE PULMONARY DISEASE, UNSPECIFIED COPD TYPE (H): Primary | ICD-10-CM

## 2024-08-15 DIAGNOSIS — R91.8 PULMONARY NODULES: ICD-10-CM

## 2024-08-15 PROCEDURE — 99214 OFFICE O/P EST MOD 30 MIN: CPT | Performed by: NURSE PRACTITIONER

## 2024-08-15 RX ORDER — TIOTROPIUM BROMIDE 18 UG/1
18 CAPSULE ORAL; RESPIRATORY (INHALATION) DAILY
Qty: 30 CAPSULE | Refills: 11 | Status: SHIPPED | OUTPATIENT
Start: 2024-08-15 | End: 2024-09-24

## 2024-08-15 NOTE — PROGRESS NOTES
Pulmonary Outpatient Consult Note  08/15/2024      Assessment and Plan:   Francisco Mac is a 63 year old male, former smoker, with history of polycythemia vera, MGUS, myelodysplasia, HTN, and hemophilia presenting today for follow-up visit for his mild COPD.  He has had improvement in his symptoms since initiation of his long-acting bronchodilator.    #. Mild COPD, reduced DLCO: Diagnosis supported by pulmonary function testing and emphysema on imaging. Patient has had significant improvement in his symptoms with initiation of LAMA and is almost not needing to use his albuterol inhaler for rescue at all. He continues to smoke marijuana 2-3 times a week, I did discuss with him that this could exacerbate his symptoms. He was evaluated by Dr. Polanco in cardiology for pulm HTN, classified as WHO class I. No further work up at this time given significant improvement with inhaler use.   Continue Spiriva 1 puff daily.  As needed albuterol for rescue.  Explained to him how smoking marijuana could trigger his COPD but he is not interested in quitting as he does not get as high with the Gummies that he does with marijuana joints.  COPD action plan: Prednisone 40mg daily x 5 days and Doxycline 100mg BID x 5 days.   #. Pulmonary nodules: Noted on CT and a high risk patient.  His follow-up CT scan performed earlier this month raises the concerns for prominent intrapulmonary lymph nodes and a slight increase in the size of his right middle lobe nodule.  While this imaging appears to be benign on the whole is a recommendation for repeat imaging in 3 months which the patient has expressed no interest in pursuing.  He states that even if he were to find lung cancer he would not want to take any treatment for this.  #. HCM: Has only completed Covid Vaccination and does not believe in other vaccines.    Follow-up: 1 year, sooner if needed.     Fior Muñoz, CNP  Pulmonary Medicine  [facility]    [phone]  ______________________________________________________________________________    CC:   Chief Complaint   Patient presents with    Follow Up     COPD      HPI:   Ed was last seen in clinic in 02/2024 by Dr. Cardozo.   Since his last clinic visit, he has been using his Spiriva inhaler once a day and this is significantly alleviated his symptoms.  He hardly ever uses his rescue inhaler (a once or twice a week).  He continues to cough up thick phlegm in the morning, minimal throughout the day.  He denies chest pain, chest tightness, fevers, chills, night sweats.  He continues to smoke marijuana 2-3 times a week and does feel winded after using this.  Has been able to increase walking to 1.5 miles daily for exercise.     Exposure history: [employer] work.  Reports regular spirometry testing at work that was normal in the past.    PMH:  Reviewed.    Current Meds:  Current Outpatient Medications   Medication Sig Dispense Refill    albuterol (PROAIR HFA/PROVENTIL HFA/VENTOLIN HFA) 108 (90 Base) MCG/ACT inhaler Inhale 2 puffs into the lungs every 6 hours as needed for shortness of breath, wheezing or cough 18 g 3    hydroxyurea (HYDREA) 500 MG capsule Take 500 mg by mouth every other day      losartan-hydrochlorothiazide (HYZAAR) 100-25 MG tablet Take 1 tablet by mouth daily      tiotropium (SPIRIVA) 18 MCG inhaled capsule Inhale 1 capsule (18 mcg) into the lungs daily 30 capsule 11    omeprazole (PRILOSEC) 20 MG DR capsule Take 20 mg by mouth daily (Patient not taking: Reported on 2/13/2024)       Allergies:  Allergies   Allergen Reactions    Morphine Nausea and Vomiting    Penicillins Rash     Social Hx:  Marital status: Lives with wife  Number of children: 3 children, 6 grandchildren   Resides in a house, built in [place], no concern for mold.  Occupational history:    service: none  Pets: dog   Smoking history: 80 pack year history  Alcohol use: 3 whiskey drinks per day  Recreational drug  use: Continues to smoke marijuana 3*/week  Recent Travel: none     Family HX: family history is not on file.    ROS:   ROS: 10-point review performed and notable for the above mentioned symptoms. The remainder reviewed and negative.     Physical Exam:  /64   Pulse 70   Wt 90.7 kg (200 lb)   SpO2 97%   BMI 27.39 kg/m      Physical Exam  Constitutional:       General: He is not in acute distress.     Appearance: He is not ill-appearing or diaphoretic.   Cardiovascular:      Rate and Rhythm: Normal rate and regular rhythm.      Heart sounds: Normal heart sounds.   Pulmonary:      Effort: Pulmonary effort is normal. No respiratory distress.      Breath sounds: No stridor. Rhonchi (very faint, end expiratory) present. No wheezing.   Musculoskeletal:      Right lower leg: No edema.      Left lower leg: No edema.   Neurological:      Mental Status: He is alert.   Psychiatric:         Behavior: Behavior normal.         PFT's     1/4/2024      Impression:  Full Pulmonary Function Test is abnormal.  PFTs are consistent with mild obstructive disease.  Spirometry is not consistent with reversibility. There is improvement in the mid flow rates.   There is no hyperinflation.  There is air-trapping.  Diffusion capacity when corrected for hemoglobin is mildly reduced.    Labs:   personally reviewed in the EMR.    Imaging studies: personally reviewed and interpreted. Below are the Radiology interpretations.    CT Chest w/ Contrast 4/8/24:  1.  Multiple bilateral pulmonary nodules, some of which may represent prominent intrapulmonary lymph nodes. The dominant 8 mm right middle lobe nodule is slightly increased in size. The other nodules are stable. Recommend additional 3 month chest CT   follow-up.  2.  Stable borderline enlarged hilar lymph nodes which are favored to be reactive. Recommend attention on follow-up.    CT 12/5/2023  1. few bilateral solid pulmonary nodules, the largest of which measures up to 7 mm.  Follow-up  CT chest in 3 months is recommended given provided history.    2. few mildly enlarged hilar and mediastinal lymph nodes.  Follow-up CT chest with IV contrast in 3 months is recommended.  Alternatively, given patient's reported history of malignancy, PET CT may now be considered, if indicated.

## 2024-08-15 NOTE — PATIENT INSTRUCTIONS
It was a pleasure seeing you in clinic today. This is what we discussed:    Continue the Spiriva daily as you have been.   Use your albuterol every 4 hours as needed for cough, shortness of breath, wheeze, or chest tightness.   Follow up 1 year, sooner if needed.   If you have worsening symptoms, questions, or need to speak with the nurse please call [phone].

## 2024-09-23 ENCOUNTER — MYC MEDICAL ADVICE (OUTPATIENT)
Dept: PULMONOLOGY | Facility: CLINIC | Age: 64
End: 2024-09-23
Payer: COMMERCIAL

## 2024-09-23 DIAGNOSIS — J44.9 CHRONIC OBSTRUCTIVE PULMONARY DISEASE, UNSPECIFIED COPD TYPE (H): Primary | ICD-10-CM

## 2024-09-24 RX ORDER — FLUTICASONE PROPIONATE AND SALMETEROL 113; 14 UG/1; UG/1
1 POWDER, METERED RESPIRATORY (INHALATION) 2 TIMES DAILY
Qty: 1 EACH | Refills: 11 | Status: SHIPPED | OUTPATIENT
Start: 2024-09-24

## 2025-01-11 DIAGNOSIS — J44.9 CHRONIC OBSTRUCTIVE PULMONARY DISEASE, UNSPECIFIED COPD TYPE (H): Primary | ICD-10-CM

## 2025-01-13 RX ORDER — TIOTROPIUM BROMIDE 18 UG/1
CAPSULE ORAL; RESPIRATORY (INHALATION)
Qty: 30 CAPSULE | Refills: 7 | Status: SHIPPED | OUTPATIENT
Start: 2025-01-13

## 2025-02-01 ENCOUNTER — HEALTH MAINTENANCE LETTER (OUTPATIENT)
Age: 65
End: 2025-02-01

## 2025-03-03 ENCOUNTER — LAB REQUISITION (OUTPATIENT)
Dept: LAB | Facility: CLINIC | Age: 65
End: 2025-03-03
Payer: COMMERCIAL

## 2025-03-03 DIAGNOSIS — Z12.5 ENCOUNTER FOR SCREENING FOR MALIGNANT NEOPLASM OF PROSTATE: ICD-10-CM

## 2025-03-03 DIAGNOSIS — I10 ESSENTIAL (PRIMARY) HYPERTENSION: ICD-10-CM

## 2025-03-03 PROCEDURE — 83735 ASSAY OF MAGNESIUM: CPT | Mod: ORL | Performed by: FAMILY MEDICINE

## 2025-03-03 PROCEDURE — G0103 PSA SCREENING: HCPCS | Mod: ORL | Performed by: FAMILY MEDICINE

## 2025-03-03 PROCEDURE — 80053 COMPREHEN METABOLIC PANEL: CPT | Mod: ORL | Performed by: FAMILY MEDICINE

## 2025-03-04 LAB
ALBUMIN SERPL BCG-MCNC: 4.2 G/DL (ref 3.5–5.2)
ALP SERPL-CCNC: 60 U/L (ref 40–150)
ALT SERPL W P-5'-P-CCNC: 22 U/L (ref 0–70)
ANION GAP SERPL CALCULATED.3IONS-SCNC: 13 MMOL/L (ref 7–15)
AST SERPL W P-5'-P-CCNC: 23 U/L (ref 0–45)
BILIRUB SERPL-MCNC: 0.4 MG/DL
BUN SERPL-MCNC: 17.6 MG/DL (ref 8–23)
CALCIUM SERPL-MCNC: 10 MG/DL (ref 8.8–10.4)
CHLORIDE SERPL-SCNC: 102 MMOL/L (ref 98–107)
CREAT SERPL-MCNC: 0.95 MG/DL (ref 0.67–1.17)
EGFRCR SERPLBLD CKD-EPI 2021: 88 ML/MIN/1.73M2
GLUCOSE SERPL-MCNC: 83 MG/DL (ref 70–99)
HCO3 SERPL-SCNC: 24 MMOL/L (ref 22–29)
MAGNESIUM SERPL-MCNC: 2.1 MG/DL (ref 1.7–2.3)
POTASSIUM SERPL-SCNC: 3.9 MMOL/L (ref 3.4–5.3)
PROT SERPL-MCNC: 7.2 G/DL (ref 6.4–8.3)
PSA SERPL DL<=0.01 NG/ML-MCNC: 0.38 NG/ML (ref 0–4.5)
SODIUM SERPL-SCNC: 139 MMOL/L (ref 135–145)

## 2025-06-23 DIAGNOSIS — J44.9 CHRONIC OBSTRUCTIVE PULMONARY DISEASE, UNSPECIFIED COPD TYPE (H): Primary | ICD-10-CM

## 2025-06-23 PROCEDURE — 99207 PR NO CHARGE NURSE ONLY: CPT | Performed by: NURSE PRACTITIONER

## 2025-06-27 RX ORDER — FLUTICASONE PROPIONATE AND SALMETEROL 113; 14 UG/1; UG/1
1 POWDER, METERED RESPIRATORY (INHALATION) 2 TIMES DAILY
Refills: 11 | OUTPATIENT
Start: 2025-06-27

## (undated) DEVICE — SUCTION MANIFOLD NEPTUNE 2 SYS 1 PORT 702-025-000

## (undated) DEVICE — FORCEP BIOPSY 2.3MM DISP COATED 000388

## (undated) DEVICE — SOL WATER IRRIG 1000ML BOTTLE 2F7114

## (undated) DEVICE — TUBING SUCTION MEDI-VAC 1/4"X20' N620A